# Patient Record
Sex: MALE | Race: WHITE | Employment: OTHER | URBAN - METROPOLITAN AREA
[De-identification: names, ages, dates, MRNs, and addresses within clinical notes are randomized per-mention and may not be internally consistent; named-entity substitution may affect disease eponyms.]

---

## 2017-01-01 ENCOUNTER — ANESTHESIA EVENT (OUTPATIENT)
Dept: SURGERY | Facility: HOSPITAL | Age: 77
End: 2017-01-01

## 2017-01-02 ENCOUNTER — ANESTHESIA (OUTPATIENT)
Dept: SURGERY | Facility: HOSPITAL | Age: 77
End: 2017-01-02

## 2017-01-02 ENCOUNTER — SURGERY (OUTPATIENT)
Age: 77
End: 2017-01-02

## 2017-01-02 NOTE — ANESTHESIA POSTPROCEDURE EVALUATION
Daryn 53 Patient Status:  Hospital Outpatient Surgery   Age/Gender 64year old male MRN [id number]   Location [address] [facility] Attending La Nena Gaona MD   Hosp Day # 0 PCP MD Bashir Jurado

## 2017-01-02 NOTE — ANESTHESIA PREPROCEDURE EVALUATION
PRE-OP EVALUATION    Patient Name: Jason Randhawa    Pre-op Diagnosis: Peritoneal mesothelioma ([facility] [address] [C45.1]    Procedure(s):  Diagnostic laparoscopy    Surgeon(s) and Role:     Caroline Garvin MD - Primary    Pre-op vitals reviewed.   Temp: 97.9 °F (36.6 12/31/2016   CO2 28.0 12/31/2016   BUN 10 12/31/2016   CREATSERUM 0.95 12/31/2016   GLU 85 12/31/2016   CA 8.3 12/31/2016            Airway      Mallampati: III  Mouth opening: >3 FB  TM distance: 4 - 6 cm  Neck ROM: full Cardiovascular    Cardiovascular e

## 2017-01-03 ENCOUNTER — PRIOR ORIGINAL RECORDS (OUTPATIENT)
Dept: OTHER | Age: 77
End: 2017-01-03

## 2017-01-03 ENCOUNTER — TELEPHONE (OUTPATIENT)
Dept: SURGERY | Facility: CLINIC | Age: 77
End: 2017-01-03

## 2017-01-03 DIAGNOSIS — C45.1 PERITONEAL MESOTHELIOMA (HCC): ICD-10-CM

## 2017-01-03 NOTE — TELEPHONE ENCOUNTER
Called patient to see how he was feeing after Diagnostic laparoscopy done yesterday. Patient states he feels good, denies any pain or discomfort.  Discussed moving forward with scheduling Laparoscopic HIPEC, patient wishes to have surgery schedule for 1/20/

## 2017-01-05 DIAGNOSIS — C45.1 PERITONEAL MESOTHELIOMA (HCC): Primary | ICD-10-CM

## 2017-01-11 ENCOUNTER — OFFICE VISIT (OUTPATIENT)
Dept: SURGERY | Facility: CLINIC | Age: 77
End: 2017-01-11

## 2017-01-11 VITALS
DIASTOLIC BLOOD PRESSURE: 89 MMHG | OXYGEN SATURATION: 98 % | SYSTOLIC BLOOD PRESSURE: 156 MMHG | WEIGHT: 148.63 LBS | BODY MASS INDEX: 24.76 KG/M2 | HEIGHT: 65 IN | HEART RATE: 79 BPM

## 2017-01-11 DIAGNOSIS — C45.1 PERITONEAL MESOTHELIOMA (HCC): Primary | ICD-10-CM

## 2017-01-11 PROCEDURE — 99024 POSTOP FOLLOW-UP VISIT: CPT | Performed by: SURGERY

## 2017-01-11 NOTE — PROGRESS NOTES
[facility]    Patient Name:  Gail Cavazos   YOB: 1940   Gender:  Male   Appt Date:  1/11/2017   Provider:  Shy Gallegos MD   Insurance:  [address] [place] [address]  Referring Provider: Dr. Polo Shone •  folic acid 1 MG Oral Tab, , Disp: , Rfl: 1  •  Levothyroxine Sodium 125 MCG Oral Tab, Take 125 mcg by mouth before breakfast., Disp: , Rfl:      Allergies Reviewed:  No Known Allergies     History:  Reviewed:  Past Medical History   Diagnosis Date   • H The findings were discussed with the patient at length. Patient appears to be a candidate for cytoreductive surgery and HIPEC. The surgical treatment matter was discussed with the patient.  This will include the above mentioned, in addition to possibility o

## 2017-01-13 ENCOUNTER — HOSPITAL (OUTPATIENT)
Dept: OTHER | Age: 77
End: 2017-01-13
Attending: INTERNAL MEDICINE

## 2017-01-13 ENCOUNTER — RESEARCH ENCOUNTER (OUTPATIENT)
Dept: HEMATOLOGY/ONCOLOGY | Facility: HOSPITAL | Age: 77
End: 2017-01-13

## 2017-01-13 NOTE — PROGRESS NOTES
STUDY: HIPEC DATABASE   ID # [id number]    Met with patient and his son today following visit with Alissa Mcclain. Reviewed HIPEC database registry, went over the consent and  allowed time for questions, concerns.  Pt verbalized good understanding and gave informe

## 2017-01-17 ENCOUNTER — ANESTHESIA EVENT (OUTPATIENT)
Dept: SURGERY | Facility: HOSPITAL | Age: 77
DRG: 342 | End: 2017-01-17
Payer: MEDICARE

## 2017-01-18 DIAGNOSIS — C45.1 PERITONEAL MESOTHELIOMA (HCC): Primary | ICD-10-CM

## 2017-01-19 ENCOUNTER — CHARTING TRANS (OUTPATIENT)
Dept: OTHER | Age: 77
End: 2017-01-19

## 2017-01-19 ENCOUNTER — DIAGNOSTIC TRANS (OUTPATIENT)
Dept: OTHER | Age: 77
End: 2017-01-19

## 2017-01-19 ENCOUNTER — HOSPITAL (OUTPATIENT)
Dept: OTHER | Age: 77
End: 2017-01-19
Attending: INTERNAL MEDICINE

## 2017-01-19 LAB
ANALYZER ANC (IANC): ABNORMAL
BASOPHILS # BLD: 0 THOUSAND/MCL (ref 0–0.3)
BASOPHILS NFR BLD: 1 %
DIFFERENTIAL METHOD BLD: ABNORMAL
EOSINOPHIL # BLD: 0.2 THOUSAND/MCL (ref 0.1–0.5)
EOSINOPHIL NFR BLD: 5 %
ERYTHROCYTE [DISTWIDTH] IN BLOOD: 15.5 % (ref 11–15)
HEMATOCRIT: 35.6 % (ref 39–51)
HGB BLD-MCNC: 11.1 GM/DL (ref 13–17)
LYMPHOCYTES # BLD: 1.5 THOUSAND/MCL (ref 1–4)
LYMPHOCYTES NFR BLD: 34 %
MCH RBC QN AUTO: 33 PG (ref 26–34)
MCHC RBC AUTO-ENTMCNC: 31.2 GM/DL (ref 32–36.5)
MCV RBC AUTO: 106 FL (ref 78–100)
MONOCYTES # BLD: 0.4 THOUSAND/MCL (ref 0.3–0.9)
MONOCYTES NFR BLD: 9 %
NEUTROPHILS # BLD: 2.2 THOUSAND/MCL (ref 1.8–7.7)
NEUTROPHILS NFR BLD: 51 %
NEUTS SEG NFR BLD: ABNORMAL %
PERCENT NRBC: ABNORMAL
PLATELET # BLD: 195 THOUSAND/MCL (ref 140–450)
RBC # BLD: 3.36 MILLION/MCL (ref 4.5–5.9)
WBC # BLD: 4.3 THOUSAND/MCL (ref 4.2–11)

## 2017-01-20 ENCOUNTER — ANESTHESIA (OUTPATIENT)
Dept: SURGERY | Facility: HOSPITAL | Age: 77
DRG: 342 | End: 2017-01-20
Payer: MEDICARE

## 2017-01-23 DIAGNOSIS — C45.1 PERITONEAL MESOTHELIOMA (HCC): Primary | ICD-10-CM

## 2017-01-24 RX ORDER — CARVEDILOL 3.12 MG/1
3.12 TABLET ORAL 2 TIMES DAILY WITH MEALS
COMMUNITY

## 2017-01-24 RX ORDER — LISINOPRIL 2.5 MG/1
2.5 TABLET ORAL DAILY
COMMUNITY

## 2017-01-24 RX ORDER — SODIUM CHLORIDE, SODIUM LACTATE, POTASSIUM CHLORIDE, CALCIUM CHLORIDE 600; 310; 30; 20 MG/100ML; MG/100ML; MG/100ML; MG/100ML
INJECTION, SOLUTION INTRAVENOUS CONTINUOUS
Status: CANCELLED | OUTPATIENT
Start: 2017-01-24

## 2017-01-24 RX ORDER — ATORVASTATIN CALCIUM 20 MG/1
20 TABLET, FILM COATED ORAL NIGHTLY
COMMUNITY

## 2017-01-31 ENCOUNTER — HOSPITAL ENCOUNTER (INPATIENT)
Facility: HOSPITAL | Age: 77
LOS: 6 days | Discharge: HOME HEALTH | DRG: 342 | End: 2017-02-06
Attending: SURGERY | Admitting: SURGERY
Payer: MEDICARE

## 2017-01-31 ENCOUNTER — APPOINTMENT (OUTPATIENT)
Dept: GENERAL RADIOLOGY | Facility: HOSPITAL | Age: 77
DRG: 342 | End: 2017-01-31
Attending: NURSE PRACTITIONER
Payer: MEDICARE

## 2017-01-31 ENCOUNTER — SURGERY (OUTPATIENT)
Age: 77
End: 2017-01-31

## 2017-01-31 DIAGNOSIS — C45.1 PERITONEAL MESOTHELIOMA (HCC): ICD-10-CM

## 2017-01-31 DIAGNOSIS — J93.9 PNEUMOTHORAX ON RIGHT: Primary | ICD-10-CM

## 2017-01-31 LAB
ALBUMIN SERPL-MCNC: 1.7 G/DL (ref 3.5–4.8)
ALP LIVER SERPL-CCNC: 55 U/L (ref 45–117)
ALT SERPL-CCNC: 191 U/L (ref 17–63)
ANTIBODY SCREEN: NEGATIVE
ARTERIAL BLD GAS O2 SATURATION: 96 % (ref 92–100)
ARTERIAL BLOOD GAS BASE EXCESS: -9.8
ARTERIAL BLOOD GAS HCO3: 15.8 MEQ/L (ref 22–26)
ARTERIAL BLOOD GAS PCO2: 33 MM HG (ref 35–45)
ARTERIAL BLOOD GAS PH: 7.3 (ref 7.35–7.45)
ARTERIAL BLOOD GAS PO2: 133 MM HG (ref 80–105)
AST SERPL-CCNC: 309 U/L (ref 15–41)
BILIRUB SERPL-MCNC: 0.6 MG/DL (ref 0.1–2)
BUN BLD-MCNC: 7 MG/DL (ref 8–20)
CALCIUM BLD-MCNC: 6.3 MG/DL (ref 8.3–10.3)
CALCULATED O2 SATURATION: 99 % (ref 92–100)
CARBOXYHEMOGLOBIN: 1.6 % SAT (ref 0–3)
CHLORIDE: 116 MMOL/L (ref 101–111)
CO2: 18 MMOL/L (ref 22–32)
CREAT BLD-MCNC: 0.57 MG/DL (ref 0.7–1.3)
ERYTHROCYTE [DISTWIDTH] IN BLOOD BY AUTOMATED COUNT: 19.7 % (ref 11.5–16)
GLUCOSE BLD-MCNC: 105 MG/DL (ref 65–99)
GLUCOSE BLD-MCNC: 138 MG/DL (ref 70–99)
GLUCOSE BLD-MCNC: 150 MG/DL (ref 65–99)
GLUCOSE BLD-MCNC: 80 MG/DL (ref 65–99)
HAV IGM SER QL: 1.1 MG/DL (ref 1.7–3)
HCT VFR BLD AUTO: 33.8 % (ref 37–53)
HGB BLD-MCNC: 11 G/DL (ref 13–17)
IONIZED CALCIUM: 1.06 MMOL/L (ref 1.12–1.32)
ISTAT BLOOD GAS BASE DEFICIT: -1 MMOL/L
ISTAT BLOOD GAS BASE DEFICIT: -4 MMOL/L
ISTAT BLOOD GAS HCO3: 20.7 MEQ/L (ref 22–26)
ISTAT BLOOD GAS HCO3: 23.4 MEQ/L (ref 22–26)
ISTAT BLOOD GAS O2 SATURATION: 100 % (ref 92–100)
ISTAT BLOOD GAS O2 SATURATION: 100 % (ref 92–100)
ISTAT BLOOD GAS PCO2: 35 MMHG (ref 35–45)
ISTAT BLOOD GAS PCO2: 38 MMHG (ref 35–45)
ISTAT BLOOD GAS PH: 7.38 (ref 7.35–7.45)
ISTAT BLOOD GAS PH: 7.4 (ref 7.35–7.45)
ISTAT BLOOD GAS PO2: 215 MMHG (ref 80–105)
ISTAT BLOOD GAS PO2: 228 MMHG (ref 80–105)
ISTAT BLOOD GAS TCO2: 22 MMOL/L (ref 22–32)
ISTAT BLOOD GAS TCO2: 24 MMOL/L (ref 22–32)
ISTAT HEMATOCRIT: 25 % (ref 37–54)
ISTAT HEMATOCRIT: 26 % (ref 37–54)
ISTAT IONIZED CALCIUM: 1.03 MMOL/L (ref 1.12–1.32)
ISTAT IONIZED CALCIUM: 1.12 MMOL/L (ref 1.12–1.32)
ISTAT POTASSIUM: 3.2 MMOL/L (ref 3.6–5.1)
ISTAT POTASSIUM: 3.3 MMOL/L (ref 3.6–5.1)
ISTAT SODIUM: 141 MMOL/L (ref 136–144)
ISTAT SODIUM: 141 MMOL/L (ref 136–144)
L/M: 8 L/MIN
LACTIC ACID ARTERIAL: <1.3 MMOL/L (ref 0.5–2)
M PROTEIN MFR SERPL ELPH: 4.1 G/DL (ref 6.1–8.3)
MCH RBC QN AUTO: 32.4 PG (ref 27–33.2)
MCHC RBC AUTO-ENTMCNC: 32.5 G/DL (ref 31–37)
MCV RBC AUTO: 99.4 FL (ref 80–99)
METHEMOGLOBIN: 0.6 % SAT (ref 0.4–1.5)
PATIENT TEMPERATURE: 97.5 F
PHOSPHATE SERPL-MCNC: 1.6 MG/DL (ref 2.5–4.9)
PLATELET # BLD AUTO: 147 10(3)UL (ref 150–450)
POTASSIUM BLOOD GAS: 3.3 MMOL/L (ref 3.6–5.1)
POTASSIUM SERPL-SCNC: 3.6 MMOL/L (ref 3.6–5.1)
RBC # BLD AUTO: 3.4 X10(6)UL (ref 3.8–5.8)
RED CELL DISTRIBUTION WIDTH-SD: 71 FL (ref 35.1–46.3)
RH BLOOD TYPE: POSITIVE
SODIUM BLOOD GAS: 141 MMOL/L (ref 136–144)
SODIUM SERPL-SCNC: 145 MMOL/L (ref 136–144)
TOTAL HEMOGLOBIN: 11.5 G/DL (ref 12.6–17.4)
WBC # BLD AUTO: 15.9 X10(3) UL (ref 4–13)

## 2017-01-31 PROCEDURE — 0FT40ZZ RESECTION OF GALLBLADDER, OPEN APPROACH: ICD-10-PCS | Performed by: SURGERY

## 2017-01-31 PROCEDURE — 82803 BLOOD GASES ANY COMBINATION: CPT

## 2017-01-31 PROCEDURE — 0DTJ0ZZ RESECTION OF APPENDIX, OPEN APPROACH: ICD-10-PCS | Performed by: SURGERY

## 2017-01-31 PROCEDURE — 82962 GLUCOSE BLOOD TEST: CPT

## 2017-01-31 PROCEDURE — 84295 ASSAY OF SERUM SODIUM: CPT | Performed by: NURSE PRACTITIONER

## 2017-01-31 PROCEDURE — 3E0M305 INTRODUCTION OF OTHER ANTINEOPLASTIC INTO PERITONEAL CAVITY, PERCUTANEOUS APPROACH: ICD-10-PCS | Performed by: SURGERY

## 2017-01-31 PROCEDURE — 82330 ASSAY OF CALCIUM: CPT

## 2017-01-31 PROCEDURE — 88342 IMHCHEM/IMCYTCHM 1ST ANTB: CPT | Performed by: SURGERY

## 2017-01-31 PROCEDURE — 84100 ASSAY OF PHOSPHORUS: CPT | Performed by: SURGERY

## 2017-01-31 PROCEDURE — 30233N1 TRANSFUSION OF NONAUTOLOGOUS RED BLOOD CELLS INTO PERIPHERAL VEIN, PERCUTANEOUS APPROACH: ICD-10-PCS | Performed by: SURGERY

## 2017-01-31 PROCEDURE — 0DBT0ZZ: ICD-10-PCS | Performed by: SURGERY

## 2017-01-31 PROCEDURE — 83735 ASSAY OF MAGNESIUM: CPT | Performed by: SURGERY

## 2017-01-31 PROCEDURE — DWY68ZZ HYPERTHERMIA OF PELVIC REGION: ICD-10-PCS | Performed by: SURGERY

## 2017-01-31 PROCEDURE — 88305 TISSUE EXAM BY PATHOLOGIST: CPT | Performed by: SURGERY

## 2017-01-31 PROCEDURE — 0BBS0ZZ: ICD-10-PCS | Performed by: SURGERY

## 2017-01-31 PROCEDURE — 86850 RBC ANTIBODY SCREEN: CPT | Performed by: SURGERY

## 2017-01-31 PROCEDURE — 83605 ASSAY OF LACTIC ACID: CPT | Performed by: NURSE PRACTITIONER

## 2017-01-31 PROCEDURE — 88304 TISSUE EXAM BY PATHOLOGIST: CPT | Performed by: SURGERY

## 2017-01-31 PROCEDURE — 88307 TISSUE EXAM BY PATHOLOGIST: CPT | Performed by: SURGERY

## 2017-01-31 PROCEDURE — 0BBR0ZZ: ICD-10-PCS | Performed by: SURGERY

## 2017-01-31 PROCEDURE — 86901 BLOOD TYPING SEROLOGIC RH(D): CPT | Performed by: SURGERY

## 2017-01-31 PROCEDURE — 87081 CULTURE SCREEN ONLY: CPT | Performed by: INTERNAL MEDICINE

## 2017-01-31 PROCEDURE — 84295 ASSAY OF SERUM SODIUM: CPT

## 2017-01-31 PROCEDURE — C9113 INJ PANTOPRAZOLE SODIUM, VIA: HCPCS | Performed by: SURGERY

## 2017-01-31 PROCEDURE — 82375 ASSAY CARBOXYHB QUANT: CPT | Performed by: NURSE PRACTITIONER

## 2017-01-31 PROCEDURE — 82803 BLOOD GASES ANY COMBINATION: CPT | Performed by: NURSE PRACTITIONER

## 2017-01-31 PROCEDURE — 85027 COMPLETE CBC AUTOMATED: CPT | Performed by: SURGERY

## 2017-01-31 PROCEDURE — 85014 HEMATOCRIT: CPT

## 2017-01-31 PROCEDURE — 83050 HGB METHEMOGLOBIN QUAN: CPT | Performed by: NURSE PRACTITIONER

## 2017-01-31 PROCEDURE — 0DBW0ZZ EXCISION OF PERITONEUM, OPEN APPROACH: ICD-10-PCS | Performed by: SURGERY

## 2017-01-31 PROCEDURE — 0WBF0ZZ EXCISION OF ABDOMINAL WALL, OPEN APPROACH: ICD-10-PCS | Performed by: SURGERY

## 2017-01-31 PROCEDURE — 88341 IMHCHEM/IMCYTCHM EA ADD ANTB: CPT | Performed by: SURGERY

## 2017-01-31 PROCEDURE — 84132 ASSAY OF SERUM POTASSIUM: CPT | Performed by: NURSE PRACTITIONER

## 2017-01-31 PROCEDURE — 86900 BLOOD TYPING SEROLOGIC ABO: CPT | Performed by: SURGERY

## 2017-01-31 PROCEDURE — 80053 COMPREHEN METABOLIC PANEL: CPT | Performed by: SURGERY

## 2017-01-31 PROCEDURE — 82330 ASSAY OF CALCIUM: CPT | Performed by: NURSE PRACTITIONER

## 2017-01-31 PROCEDURE — 71010 XR CHEST AP PORTABLE  (CPT=71010): CPT

## 2017-01-31 PROCEDURE — 86920 COMPATIBILITY TEST SPIN: CPT

## 2017-01-31 PROCEDURE — 0TN70ZZ RELEASE LEFT URETER, OPEN APPROACH: ICD-10-PCS | Performed by: SURGERY

## 2017-01-31 PROCEDURE — 85018 HEMOGLOBIN: CPT | Performed by: NURSE PRACTITIONER

## 2017-01-31 PROCEDURE — 84132 ASSAY OF SERUM POTASSIUM: CPT

## 2017-01-31 DEVICE — VENTRIO HERNIA PATCH CIRCLE
Type: IMPLANTABLE DEVICE | Site: ABDOMEN | Status: FUNCTIONAL
Brand: VENTRIO HERNIA PATCH

## 2017-01-31 DEVICE — SURGIFLO HEMOSTATIC MATRIX KIT: Type: IMPLANTABLE DEVICE | Status: FUNCTIONAL

## 2017-01-31 RX ORDER — HYDROMORPHONE HYDROCHLORIDE 1 MG/ML
0.5 INJECTION, SOLUTION INTRAMUSCULAR; INTRAVENOUS; SUBCUTANEOUS ONCE
Status: DISCONTINUED | OUTPATIENT
Start: 2017-01-31 | End: 2017-02-01

## 2017-01-31 RX ORDER — HEPARIN SODIUM 5000 [USP'U]/ML
5000 INJECTION, SOLUTION INTRAVENOUS; SUBCUTANEOUS ONCE
Status: COMPLETED | OUTPATIENT
Start: 2017-01-31 | End: 2017-01-31

## 2017-01-31 RX ORDER — CISPLATIN 1 MG/ML
45 INJECTION INTRAVENOUS ONCE
Status: COMPLETED | OUTPATIENT
Start: 2017-01-31 | End: 2017-01-31

## 2017-01-31 RX ORDER — METRONIDAZOLE 500 MG/100ML
INJECTION, SOLUTION INTRAVENOUS
Status: DISPENSED
Start: 2017-01-31 | End: 2017-01-31

## 2017-01-31 RX ORDER — ENOXAPARIN SODIUM 100 MG/ML
40 INJECTION SUBCUTANEOUS DAILY
Status: DISCONTINUED | OUTPATIENT
Start: 2017-02-01 | End: 2017-02-06

## 2017-01-31 RX ORDER — NALOXONE HYDROCHLORIDE 0.4 MG/ML
80 INJECTION, SOLUTION INTRAMUSCULAR; INTRAVENOUS; SUBCUTANEOUS AS NEEDED
Status: DISCONTINUED | OUTPATIENT
Start: 2017-01-31 | End: 2017-02-01

## 2017-01-31 RX ORDER — IPRATROPIUM BROMIDE AND ALBUTEROL SULFATE 2.5; .5 MG/3ML; MG/3ML
3 SOLUTION RESPIRATORY (INHALATION) EVERY 4 HOURS PRN
Status: DISCONTINUED | OUTPATIENT
Start: 2017-01-31 | End: 2017-02-06

## 2017-01-31 RX ORDER — LIDOCAINE HYDROCHLORIDE 10 MG/ML
10 INJECTION, SOLUTION INFILTRATION; PERINEURAL ONCE
Status: COMPLETED | OUTPATIENT
Start: 2017-02-01 | End: 2017-02-01

## 2017-01-31 RX ORDER — SODIUM CHLORIDE, SODIUM LACTATE, POTASSIUM CHLORIDE, CALCIUM CHLORIDE 600; 310; 30; 20 MG/100ML; MG/100ML; MG/100ML; MG/100ML
INJECTION, SOLUTION INTRAVENOUS CONTINUOUS
Status: DISCONTINUED | OUTPATIENT
Start: 2017-01-31 | End: 2017-02-01

## 2017-01-31 RX ORDER — LIDOCAINE HYDROCHLORIDE 20 MG/ML
INJECTION, SOLUTION EPIDURAL; INFILTRATION; INTRACAUDAL; PERINEURAL
Status: DISCONTINUED
Start: 2017-01-31 | End: 2017-02-01 | Stop reason: WASHOUT

## 2017-01-31 RX ORDER — HEPARIN SODIUM 5000 [USP'U]/ML
INJECTION, SOLUTION INTRAVENOUS; SUBCUTANEOUS
Status: COMPLETED
Start: 2017-01-31 | End: 2017-01-31

## 2017-01-31 RX ORDER — METRONIDAZOLE 500 MG/100ML
500 INJECTION, SOLUTION INTRAVENOUS ONCE
Status: COMPLETED | OUTPATIENT
Start: 2017-01-31 | End: 2017-01-31

## 2017-01-31 RX ORDER — HYDROMORPHONE HYDROCHLORIDE 1 MG/ML
0.4 INJECTION, SOLUTION INTRAMUSCULAR; INTRAVENOUS; SUBCUTANEOUS EVERY 5 MIN PRN
Status: DISPENSED | OUTPATIENT
Start: 2017-01-31 | End: 2017-01-31

## 2017-01-31 RX ORDER — ONDANSETRON 2 MG/ML
4 INJECTION INTRAMUSCULAR; INTRAVENOUS EVERY 6 HOURS PRN
Status: DISCONTINUED | OUTPATIENT
Start: 2017-01-31 | End: 2017-02-06

## 2017-01-31 RX ORDER — SODIUM CHLORIDE 9 MG/ML
INJECTION, SOLUTION INTRAVENOUS CONTINUOUS
Status: DISCONTINUED | OUTPATIENT
Start: 2017-01-31 | End: 2017-02-01

## 2017-01-31 RX ORDER — LIDOCAINE HYDROCHLORIDE 10 MG/ML
INJECTION, SOLUTION EPIDURAL; INFILTRATION; INTRACAUDAL; PERINEURAL
Status: DISPENSED
Start: 2017-01-31 | End: 2017-02-01

## 2017-01-31 RX ORDER — CISPLATIN 1 MG/ML
45 INJECTION INTRAVENOUS ONCE
Status: DISCONTINUED | OUTPATIENT
Start: 2017-01-31 | End: 2017-01-31 | Stop reason: HOSPADM

## 2017-01-31 NOTE — H&P
There has been no significant in patient status as documented in EPIC. Surgery revisted. To proceed as planned. Ayana Lee MD FACS    Director of Surgical Oncology  Department of [address] [place]. [address]

## 2017-01-31 NOTE — ANESTHESIA PREPROCEDURE EVALUATION
PRE-OP EVALUATION    Patient Name: Gail Cavazos    Pre-op Diagnosis: Peritoneal mesothelioma ([facility] [address] [C45.1]    Procedure(s):  CYTOREDUCTIVE SURGERY, POSSIBLE MULTI-ORGAN RESECTION, HYPERTHERMIC INTRAPERITONEAL CHEMOTHERAPY     Surgeon(s) and Role:     * Smoking status: Former Smoker  1.00 Packs/Day  For 15.00 Years     Quit date: 05/01/2016    Smokeless tobacco: Never Used    Alcohol Use: Yes    Comment: 1-2 per month/social       Drug Use: No     Available pre-op labs reviewed.     Lab Results  Component

## 2017-02-01 ENCOUNTER — APPOINTMENT (OUTPATIENT)
Dept: GENERAL RADIOLOGY | Facility: HOSPITAL | Age: 77
DRG: 342 | End: 2017-02-01
Attending: NURSE PRACTITIONER
Payer: MEDICARE

## 2017-02-01 ENCOUNTER — APPOINTMENT (OUTPATIENT)
Dept: GENERAL RADIOLOGY | Facility: HOSPITAL | Age: 77
DRG: 342 | End: 2017-02-01
Attending: SURGERY
Payer: MEDICARE

## 2017-02-01 ENCOUNTER — APPOINTMENT (OUTPATIENT)
Dept: GENERAL RADIOLOGY | Facility: HOSPITAL | Age: 77
DRG: 342 | End: 2017-02-01
Attending: INTERNAL MEDICINE
Payer: MEDICARE

## 2017-02-01 LAB
ALBUMIN SERPL-MCNC: 2.1 G/DL (ref 3.5–4.8)
ALLENS TEST: POSITIVE
ALP LIVER SERPL-CCNC: 62 U/L (ref 45–117)
ALT SERPL-CCNC: 264 U/L (ref 17–63)
ARTERIAL BLD GAS O2 SATURATION: 95 % (ref 92–100)
ARTERIAL BLD GAS O2 SATURATION: 95 % (ref 92–100)
ARTERIAL BLOOD GAS BASE EXCESS: -10.6
ARTERIAL BLOOD GAS BASE EXCESS: -5.8
ARTERIAL BLOOD GAS HCO3: 15.5 MEQ/L (ref 22–26)
ARTERIAL BLOOD GAS HCO3: 19.6 MEQ/L (ref 22–26)
ARTERIAL BLOOD GAS PCO2: 35 MM HG (ref 35–45)
ARTERIAL BLOOD GAS PCO2: 38 MM HG (ref 35–45)
ARTERIAL BLOOD GAS PH: 7.26 (ref 7.35–7.45)
ARTERIAL BLOOD GAS PH: 7.33 (ref 7.35–7.45)
ARTERIAL BLOOD GAS PO2: 96 MM HG (ref 80–105)
ARTERIAL BLOOD GAS PO2: 97 MM HG (ref 80–105)
AST SERPL-CCNC: 330 U/L (ref 15–41)
BILIRUB SERPL-MCNC: 0.3 MG/DL (ref 0.1–2)
BUN BLD-MCNC: 8 MG/DL (ref 8–20)
CALCIUM BLD-MCNC: 6.8 MG/DL (ref 8.3–10.3)
CALCULATED O2 SATURATION: 96 % (ref 92–100)
CALCULATED O2 SATURATION: 97 % (ref 92–100)
CARBOXYHEMOGLOBIN: 1.2 % SAT (ref 0–3)
CARBOXYHEMOGLOBIN: 1.2 % SAT (ref 0–3)
CHLORIDE: 112 MMOL/L (ref 101–111)
CK: 385 IU/L (ref 39–308)
CKMB: 5.6 NG/ML (ref 0–5)
CO2: 19 MMOL/L (ref 22–32)
CREAT BLD-MCNC: 0.72 MG/DL (ref 0.7–1.3)
ERYTHROCYTE [DISTWIDTH] IN BLOOD BY AUTOMATED COUNT: 19.7 % (ref 11.5–16)
FIO2: 40 %
GLUCOSE BLD-MCNC: 108 MG/DL (ref 65–99)
GLUCOSE BLD-MCNC: 116 MG/DL (ref 65–99)
GLUCOSE BLD-MCNC: 137 MG/DL (ref 65–99)
GLUCOSE BLD-MCNC: 142 MG/DL (ref 65–99)
GLUCOSE BLD-MCNC: 176 MG/DL (ref 70–99)
HAV IGM SER QL: 2.1 MG/DL (ref 1.7–3)
HCT VFR BLD AUTO: 37 % (ref 37–53)
HGB BLD-MCNC: 12 G/DL (ref 13–17)
IONIZED CALCIUM: 1.1 MMOL/L (ref 1.12–1.32)
IONIZED CALCIUM: 1.11 MMOL/L (ref 1.12–1.32)
L/M: 15 L/MIN
L/M: 5 L/MIN
LACTIC ACID ARTERIAL: 1.4 MMOL/L (ref 0.5–2)
LACTIC ACID ARTERIAL: 1.4 MMOL/L (ref 0.5–2)
M PROTEIN MFR SERPL ELPH: 5 G/DL (ref 6.1–8.3)
MB INDEX: 1 % (ref ?–4)
MCH RBC QN AUTO: 31.9 PG (ref 27–33.2)
MCHC RBC AUTO-ENTMCNC: 32.4 G/DL (ref 31–37)
MCV RBC AUTO: 98.4 FL (ref 80–99)
METHEMOGLOBIN: 0.6 % SAT (ref 0.4–1.5)
METHEMOGLOBIN: 0.6 % SAT (ref 0.4–1.5)
PATIENT TEMPERATURE: 98.3 F
PATIENT TEMPERATURE: 98.4 F
PHOSPHATE SERPL-MCNC: 3.1 MG/DL (ref 2.5–4.9)
PLATELET # BLD AUTO: 145 10(3)UL (ref 150–450)
POTASSIUM BLOOD GAS: 3.7 MMOL/L (ref 3.6–5.1)
POTASSIUM BLOOD GAS: 4.1 MMOL/L (ref 3.6–5.1)
POTASSIUM SERPL-SCNC: 3.8 MMOL/L (ref 3.6–5.1)
RBC # BLD AUTO: 3.76 X10(6)UL (ref 3.8–5.8)
RED CELL DISTRIBUTION WIDTH-SD: 70.7 FL (ref 35.1–46.3)
SODIUM BLOOD GAS: 140 MMOL/L (ref 136–144)
SODIUM BLOOD GAS: 141 MMOL/L (ref 136–144)
SODIUM SERPL-SCNC: 145 MMOL/L (ref 136–144)
TOTAL HEMOGLOBIN: 11.6 G/DL (ref 12.6–17.4)
TOTAL HEMOGLOBIN: 12.1 G/DL (ref 12.6–17.4)
TROPONIN: <0.046 NG/ML (ref ?–0.05)
WBC # BLD AUTO: 12.9 X10(3) UL (ref 4–13)

## 2017-02-01 PROCEDURE — 36600 WITHDRAWAL OF ARTERIAL BLOOD: CPT | Performed by: INTERNAL MEDICINE

## 2017-02-01 PROCEDURE — 97163 PT EVAL HIGH COMPLEX 45 MIN: CPT

## 2017-02-01 PROCEDURE — A4216 STERILE WATER/SALINE, 10 ML: HCPCS | Performed by: INTERNAL MEDICINE

## 2017-02-01 PROCEDURE — P9045 ALBUMIN (HUMAN), 5%, 250 ML: HCPCS | Performed by: NURSE PRACTITIONER

## 2017-02-01 PROCEDURE — 97116 GAIT TRAINING THERAPY: CPT

## 2017-02-01 PROCEDURE — 83050 HGB METHEMOGLOBIN QUAN: CPT | Performed by: INTERNAL MEDICINE

## 2017-02-01 PROCEDURE — 0W9930Z DRAINAGE OF RIGHT PLEURAL CAVITY WITH DRAINAGE DEVICE, PERCUTANEOUS APPROACH: ICD-10-PCS | Performed by: SURGERY

## 2017-02-01 PROCEDURE — 93005 ELECTROCARDIOGRAM TRACING: CPT

## 2017-02-01 PROCEDURE — 85018 HEMOGLOBIN: CPT | Performed by: INTERNAL MEDICINE

## 2017-02-01 PROCEDURE — 80053 COMPREHEN METABOLIC PANEL: CPT | Performed by: SURGERY

## 2017-02-01 PROCEDURE — 85027 COMPLETE CBC AUTOMATED: CPT | Performed by: NURSE PRACTITIONER

## 2017-02-01 PROCEDURE — 82962 GLUCOSE BLOOD TEST: CPT

## 2017-02-01 PROCEDURE — 82375 ASSAY CARBOXYHB QUANT: CPT | Performed by: INTERNAL MEDICINE

## 2017-02-01 PROCEDURE — 71010 XR CHEST AP PORTABLE  (CPT=71010): CPT

## 2017-02-01 PROCEDURE — 82553 CREATINE MB FRACTION: CPT | Performed by: INTERNAL MEDICINE

## 2017-02-01 PROCEDURE — C9113 INJ PANTOPRAZOLE SODIUM, VIA: HCPCS | Performed by: NURSE PRACTITIONER

## 2017-02-01 PROCEDURE — P9047 ALBUMIN (HUMAN), 25%, 50ML: HCPCS | Performed by: SURGERY

## 2017-02-01 PROCEDURE — 93010 ELECTROCARDIOGRAM REPORT: CPT | Performed by: INTERNAL MEDICINE

## 2017-02-01 PROCEDURE — 83605 ASSAY OF LACTIC ACID: CPT | Performed by: INTERNAL MEDICINE

## 2017-02-01 PROCEDURE — 83735 ASSAY OF MAGNESIUM: CPT | Performed by: NURSE PRACTITIONER

## 2017-02-01 PROCEDURE — 84484 ASSAY OF TROPONIN QUANT: CPT | Performed by: INTERNAL MEDICINE

## 2017-02-01 PROCEDURE — 84295 ASSAY OF SERUM SODIUM: CPT | Performed by: INTERNAL MEDICINE

## 2017-02-01 PROCEDURE — 82330 ASSAY OF CALCIUM: CPT | Performed by: INTERNAL MEDICINE

## 2017-02-01 PROCEDURE — 82803 BLOOD GASES ANY COMBINATION: CPT | Performed by: INTERNAL MEDICINE

## 2017-02-01 PROCEDURE — 82550 ASSAY OF CK (CPK): CPT | Performed by: INTERNAL MEDICINE

## 2017-02-01 PROCEDURE — 84100 ASSAY OF PHOSPHORUS: CPT | Performed by: NURSE PRACTITIONER

## 2017-02-01 PROCEDURE — 84132 ASSAY OF SERUM POTASSIUM: CPT | Performed by: INTERNAL MEDICINE

## 2017-02-01 RX ORDER — DOBUTAMINE HYDROCHLORIDE 100 MG/100ML
1 INJECTION INTRAVENOUS CONTINUOUS
Status: DISCONTINUED | OUTPATIENT
Start: 2017-02-01 | End: 2017-02-01

## 2017-02-01 RX ORDER — ALBUMIN, HUMAN INJ 5% 5 %
250 SOLUTION INTRAVENOUS ONCE
Status: COMPLETED | OUTPATIENT
Start: 2017-02-01 | End: 2017-02-01

## 2017-02-01 RX ORDER — POTASSIUM CHLORIDE 14.9 MG/ML
20 INJECTION INTRAVENOUS ONCE
Status: COMPLETED | OUTPATIENT
Start: 2017-02-01 | End: 2017-02-01

## 2017-02-01 RX ORDER — HYDROMORPHONE HYDROCHLORIDE 1 MG/ML
0.3 INJECTION, SOLUTION INTRAMUSCULAR; INTRAVENOUS; SUBCUTANEOUS ONCE
Status: COMPLETED | OUTPATIENT
Start: 2017-02-01 | End: 2017-02-01

## 2017-02-01 RX ORDER — ENALAPRILAT 2.5 MG/2ML
1.25 INJECTION INTRAVENOUS EVERY 6 HOURS PRN
Status: DISCONTINUED | OUTPATIENT
Start: 2017-02-01 | End: 2017-02-06

## 2017-02-01 RX ORDER — FUROSEMIDE 10 MG/ML
20 INJECTION INTRAMUSCULAR; INTRAVENOUS ONCE
Status: COMPLETED | OUTPATIENT
Start: 2017-02-01 | End: 2017-02-01

## 2017-02-01 RX ORDER — METOPROLOL TARTRATE 5 MG/5ML
5 INJECTION INTRAVENOUS EVERY 6 HOURS
Status: DISCONTINUED | OUTPATIENT
Start: 2017-02-01 | End: 2017-02-06

## 2017-02-01 RX ORDER — ALBUMIN (HUMAN) 12.5 G/50ML
25 SOLUTION INTRAVENOUS ONCE
Status: COMPLETED | OUTPATIENT
Start: 2017-02-01 | End: 2017-02-01

## 2017-02-01 NOTE — PLAN OF CARE
CARDIOVASCULAR - ADULT    • Maintains optimal cardiac output and hemodynamic stability Progressing        GASTROINTESTINAL - ADULT    • Minimal or absence of nausea and vomiting Progressing        GENITOURINARY - ADULT    • Absence of urinary retention Pro

## 2017-02-01 NOTE — CONSULTS
[facility]  Report of Consultation    Durga Stanley Patient Status:  Inpatient    1940 MRN [id number]   [facility] 4SW-A Attending Iqra Caro MD   Hosp Day # 1 PCP Alis Alfaro MD     Reason for Consultation: Intensive c documented 50 years ago  · Hyperlipidemia    History reviewed. No pertinent past surgical history.   Family History   Problem Relation Age of Onset   • Cancer Daughter 43     breast cancer   • Cancer Son 7     Leukemia       reports that he quit smoking abo distress. Head: Normocephalic, without obvious abnormality, atraumatic. Eyes: Conjunctivae/corneas clear. No scleral icterus. No conjunctival     hemorrhage. Nose: Nares normal.   Throat: Lips, mucosa, and tongue normal.  No thrush noted.    Neck: S CHOLECYSTECTOMY, APPENDECTOMY, LEFT URETERLYSIS, PARTIAL RESECTION ABDOMINAL WALL, OMENTECTOMY, HIPEC WITH CISPLATIN     Assessment and Plan:    · Status post extensive abdominal surgery/debulking and HIPEC instillation for treatment of mesothelioma  · Pos

## 2017-02-01 NOTE — ANESTHESIA POSTPROCEDURE EVALUATION
Daryn 51 Patient Status:  Inpatient   Age/Gender 64year old male MRN [id number]   [facility] 4SW-A Attending Connor Kelly MD   Hosp Day # 0 PCP Michael Waller MD       Anesthesia Post-op Note    Procedure(s):  CYTO

## 2017-02-01 NOTE — CDS QUERY
Chest Imaging Results - Clinical Significance of Air in the Pleural [place]  Dear Doctor:  Clinical information (provided below) suggests air in the pleural space.  For accurate ICD-10-CM code assignment to reflect s

## 2017-02-01 NOTE — PROGRESS NOTES
ICU  Critical Care APN Progress Note    NAME: Florence Putnam - ROOM: [address] [place] [address] - MRN: [id number] - Age: 64year old - :1940    History Of Present Illness:  Florence Putnam is a 64year old male with PMHx significant for htn, hld, peritoneal s/p CYT Physical Exam:    General Appearance: Alert, cooperative, no distress, appears stated age  Neck: No JVD, neck supple, no adenopathy, trachea midline, no carotid bruits  Lungs: diminished to auscultation bilaterally, respirations unlabored  Heart: Regular r CREATSERUM 0.57 01/31/2017   BUN 7 01/31/2017    01/31/2017   K 3.6 01/31/2017    01/31/2017   CO2 18.0 01/31/2017    01/31/2017   CA 6.3 01/31/2017   ALB 1.7 01/31/2017   ALKPHO 55 01/31/2017   BILT 0.6 01/31/2017   TP 4.1 01/31/2017

## 2017-02-01 NOTE — PROGRESS NOTES
[facility] [address] History & Physical    Vlad Terry Patient Status:  Inpatient    1940 MRN [id number]   [facility] 4SW-A Attending Maurilio Layne MD   Hosp Day # 1 PCP Amanda Bojorquez MD     History of Present Illnes 99%    General Appearance:    Alert, cooperative, no distress, appears stated age   Head:    Normocephalic, without obvious abnormality, atraumatic   Eyes:    PERRL, conjunctiva/corneas clear, EOM's intact, fundi     benign, both eyes        Ears:    Jamee Erie Scan    Assessment:  Patient Active Problem List:     Hypertension     Hypothyroidism     Hyperlipidemia     Peritoneal mesothelioma ([facility] [address]          Plan:    Pt cleared for surgery earlier and underwent successful excision.     Patricia Sotelo  2/1/2017  4:40

## 2017-02-01 NOTE — PROGRESS NOTES
POD#1  I was involved with events overnight  Patient feels well    Blood pressure 136/65, pulse 81, temperature 98.4 °F (36.9 °C), temperature source Temporal, resp. rate 18, height 1.651 m (5' 5\"), weight 70.5 kg (155 lb 6.8 oz), SpO2 95 %.   I/O last 3 c

## 2017-02-01 NOTE — OPERATIVE REPORT
[address]    PATIENT'S NAME: Jhonny Dumont   ATTENDING PHYSICIAN: Giuseppe Chan. Herbert García MD   OPERATING PHYSICIAN: Giuseppe Chan.  Herbert García MD   PATIENT ACCOUNT#:   [id number]    LOCATION:  [address] [place] [address]  MEDICAL RECORD #:   [id number]       DATE OF BIRTH:  [birth year]/ Region 5, one. Region 6, two. Region 7, one. Region 8, one. Region 9, one. Region 10, two. Region 11, two. Region 12, one. Thus, peritoneal carcinomatosis index 20. The patient had mainly bulky disease involving the omentum.   He had what appeared sutured it to the diaphragm for the repair, using Prolene suture placed in a running fashion.   I then stripped other areas of peritoneal involvement, and used the PlasmaJet to destroy lesions within the small bowel mesentery that were scattered throughout, was entered. A catheter was dispensed with appropriately. Hemostasis was achieved and maintained. I placed a drain within the left subdiaphragmatic region and another drain within the pelvis. These were stitched in place using 3-0 nylon sutures.   The f

## 2017-02-01 NOTE — BRIEF OP NOTE
[address] [facility]  Brief Op Note     Hieu Mattson Location: OR   [facility] [id number] MRN [id number]   Admission Date 1/31/2017 Operation Date 1/31/2017   Attending Physician Gigi Chamorro MD Operating Physician Ventura Lauren MD       Pre-Operative Wilder Solis

## 2017-02-02 ENCOUNTER — APPOINTMENT (OUTPATIENT)
Dept: GENERAL RADIOLOGY | Facility: HOSPITAL | Age: 77
DRG: 342 | End: 2017-02-02
Attending: INTERNAL MEDICINE
Payer: MEDICARE

## 2017-02-02 LAB
ALBUMIN SERPL-MCNC: 2.3 G/DL (ref 3.5–4.8)
ALP LIVER SERPL-CCNC: 51 U/L (ref 45–117)
ALT SERPL-CCNC: 124 U/L (ref 17–63)
AST SERPL-CCNC: 92 U/L (ref 15–41)
ATRIAL RATE: 84 BPM
BASOPHILS # BLD AUTO: 0.02 X10(3) UL (ref 0–0.1)
BASOPHILS NFR BLD AUTO: 0.2 %
BILIRUB SERPL-MCNC: 0.4 MG/DL (ref 0.1–2)
BLOOD TYPE BARCODE: 7300
BUN BLD-MCNC: 10 MG/DL (ref 8–20)
CALCIUM BLD-MCNC: 7.3 MG/DL (ref 8.3–10.3)
CHLORIDE: 112 MMOL/L (ref 101–111)
CO2: 24 MMOL/L (ref 22–32)
CREAT BLD-MCNC: 0.82 MG/DL (ref 0.7–1.3)
EOSINOPHIL # BLD AUTO: 0.01 X10(3) UL (ref 0–0.3)
EOSINOPHIL NFR BLD AUTO: 0.1 %
ERYTHROCYTE [DISTWIDTH] IN BLOOD BY AUTOMATED COUNT: 19.3 % (ref 11.5–16)
GLUCOSE BLD-MCNC: 84 MG/DL (ref 65–99)
GLUCOSE BLD-MCNC: 85 MG/DL (ref 65–99)
GLUCOSE BLD-MCNC: 86 MG/DL (ref 65–99)
GLUCOSE BLD-MCNC: 90 MG/DL (ref 65–99)
GLUCOSE BLD-MCNC: 91 MG/DL (ref 65–99)
GLUCOSE BLD-MCNC: 94 MG/DL (ref 65–99)
GLUCOSE BLD-MCNC: 98 MG/DL (ref 70–99)
HAV IGM SER QL: 1.7 MG/DL (ref 1.7–3)
HCT VFR BLD AUTO: 31.4 % (ref 37–53)
HGB BLD-MCNC: 10.2 G/DL (ref 13–17)
IMMATURE GRANULOCYTE COUNT: 0.04 X10(3) UL (ref 0–1)
IMMATURE GRANULOCYTE RATIO %: 0.4 %
LYMPHOCYTES # BLD AUTO: 0.85 X10(3) UL (ref 0.9–4)
LYMPHOCYTES NFR BLD AUTO: 7.8 %
M PROTEIN MFR SERPL ELPH: 4.8 G/DL (ref 6.1–8.3)
MCH RBC QN AUTO: 32 PG (ref 27–33.2)
MCHC RBC AUTO-ENTMCNC: 32.5 G/DL (ref 31–37)
MCV RBC AUTO: 98.4 FL (ref 80–99)
MONOCYTES # BLD AUTO: 0.83 X10(3) UL (ref 0.1–0.6)
MONOCYTES NFR BLD AUTO: 7.6 %
NEUTROPHIL ABS PRELIM: 9.21 X10 (3) UL (ref 1.3–6.7)
NEUTROPHILS # BLD AUTO: 9.21 X10(3) UL (ref 1.3–6.7)
NEUTROPHILS NFR BLD AUTO: 83.9 %
P AXIS: 33 DEGREES
P-R INTERVAL: 144 MS
PHOSPHATE SERPL-MCNC: 2.6 MG/DL (ref 2.5–4.9)
PLATELET # BLD AUTO: 114 10(3)UL (ref 150–450)
POTASSIUM SERPL-SCNC: 3.6 MMOL/L (ref 3.6–5.1)
Q-T INTERVAL: 392 MS
QRS DURATION: 114 MS
QTC CALCULATION (BEZET): 463 MS
R AXIS: 46 DEGREES
RBC # BLD AUTO: 3.19 X10(6)UL (ref 3.8–5.8)
RED CELL DISTRIBUTION WIDTH-SD: 69.5 FL (ref 35.1–46.3)
SODIUM SERPL-SCNC: 143 MMOL/L (ref 136–144)
T AXIS: -8 DEGREES
TSI SER-ACNC: 3.93 MIU/ML (ref 0.35–5.5)
VENTRICULAR RATE: 84 BPM
WBC # BLD AUTO: 11 X10(3) UL (ref 4–13)

## 2017-02-02 PROCEDURE — C9113 INJ PANTOPRAZOLE SODIUM, VIA: HCPCS | Performed by: NURSE PRACTITIONER

## 2017-02-02 PROCEDURE — 85025 COMPLETE CBC W/AUTO DIFF WBC: CPT | Performed by: INTERNAL MEDICINE

## 2017-02-02 PROCEDURE — 82962 GLUCOSE BLOOD TEST: CPT

## 2017-02-02 PROCEDURE — 97110 THERAPEUTIC EXERCISES: CPT

## 2017-02-02 PROCEDURE — 84100 ASSAY OF PHOSPHORUS: CPT | Performed by: INTERNAL MEDICINE

## 2017-02-02 PROCEDURE — 97116 GAIT TRAINING THERAPY: CPT

## 2017-02-02 PROCEDURE — 80053 COMPREHEN METABOLIC PANEL: CPT | Performed by: INTERNAL MEDICINE

## 2017-02-02 PROCEDURE — 71010 XR CHEST AP PORTABLE  (CPT=71010): CPT

## 2017-02-02 PROCEDURE — 84443 ASSAY THYROID STIM HORMONE: CPT | Performed by: SPECIALIST

## 2017-02-02 PROCEDURE — 83735 ASSAY OF MAGNESIUM: CPT | Performed by: INTERNAL MEDICINE

## 2017-02-02 RX ORDER — NALOXONE HYDROCHLORIDE 0.4 MG/ML
0.08 INJECTION, SOLUTION INTRAMUSCULAR; INTRAVENOUS; SUBCUTANEOUS
Status: DISCONTINUED | OUTPATIENT
Start: 2017-02-02 | End: 2017-02-06

## 2017-02-02 RX ORDER — DIPHENHYDRAMINE HYDROCHLORIDE 50 MG/ML
12.5 INJECTION INTRAMUSCULAR; INTRAVENOUS EVERY 4 HOURS PRN
Status: DISCONTINUED | OUTPATIENT
Start: 2017-02-02 | End: 2017-02-06

## 2017-02-02 RX ORDER — POTASSIUM CHLORIDE 29.8 MG/ML
40 INJECTION INTRAVENOUS ONCE
Status: COMPLETED | OUTPATIENT
Start: 2017-02-02 | End: 2017-02-02

## 2017-02-02 NOTE — PROGRESS NOTES
[address] Patient Status:  Inpatient    1940 MRN [id number]   [facility] 4SW-A Attending Connor Kelly MD   Hosp Day # 2 PCP Michael Waller MD       POD # 2 - s/p cytoreduction with HIPEC (c Carboxyhemoglobin 1.2 0.0-3.0 % SAT   Methemoglobin 0.6 0.4-1.5 % SAT   Ionized Calcium 1.10 (L) 1.12-1.32 mmol/L   Allens Test Positive    Sample Site Right Radial    L/M 5.0 L/min   ABG Device High flow nasal cannula    FIO2 40 %   Potassium Blood Gas 3. Collection Time: 02/02/17  4:35 AM   Result Value Ref Range   WBC 11.0 4.0-13.0 x10(3) uL   RBC 3.19 (L) 3.80-5.80 x10(6)uL   HGB 10.2 (L) 13.0-17.0 g/dL   HCT 31.4 (L) 37.0-53.0 %   .0 (L) 150.0-450.0 10(3)uL   MCV 98.4 80.0-99.0 fL   MCH 32.0 27. 0

## 2017-02-02 NOTE — CONSULTS
[facility]acy Note:  Pain Consult    Wash Drafts is a 64year old male started on Fentanyl PCA by Teresa MORRIS. Pharmacy was consulted to review medication profile and to discontinue previously ordered narcotics and sedatives.     Medication pro

## 2017-02-02 NOTE — CONSULTS
[facility]    PATIENT'S NAME: Bishnu Darron   ATTENDING PHYSICIAN: Deena Castaneda. Agnes Crump MD   CONSULTING PHYSICIAN: Paula Martinez M.D.    PATIENT ACCOUNT#:   [de-identified]    LOCATION:  [address] [place] [address]  MEDICAL RECORD #:   [id number]       DATE OF BIRTH:  [birth year] diffusely diseased with 99% stenosis. The apical LAD filled by collaterals. The right coronary artery was a dominant vessel also and appeared to be a decanalized, totally occluded vessel. His ejection fraction was 30% to 35%.   He was seen in surgical co infarct age undetermined. Per the records, he had an old inferior infarct, even noted in the office with it. Telemetry strips have revealed sinus rhythm. IMPRESSION:    1.    Status post extensive abdominal surgery and debulking with HIPEC instillati

## 2017-02-02 NOTE — PROGRESS NOTES
[facility]  Cardiology Progress Note    Gail Cavazos Patient Status:  Inpatient    1940 MRN [id number]   [facility] 7NE-A Attending Nemo Cervantes MD   Hosp Day # 2 PCP Emily Nguyen MD     Subjective:  No chest pain or shortn 02/02/2017   PHOS 2.6 02/02/2017   PGLU 85 02/02/2017       Allergies:  No Known Allergies    Medications:    Current Facility-Administered Medications:  Naloxone HCl (NARCAN) 0.4 MG/ML injection 0.08 mg 0.08 mg Intravenous Q2 Min PRN   DiphenhydrAMINE HCl

## 2017-02-02 NOTE — PLAN OF CARE
Received patient today from ICU alert and oriented x3. Left DILLAN drain with bloody drainage noted to the dressing. Dressing changed. Both DILLAN's putting out SS fluid. Chest tube to suction, dressing CDI. NG to LIS. Midline incision approximated with akil.  Soham Grey

## 2017-02-02 NOTE — PLAN OF CARE
CARDIOVASCULAR - ADULT    • Maintains optimal cardiac output and hemodynamic stability Progressing          GASTROINTESTINAL - ADULT    • Minimal or absence of nausea and vomiting Progressing    • Maintains or returns to baseline bowel function Progressing

## 2017-02-02 NOTE — PROGRESS NOTES
[facility]  Progress Note    Rufina Norman Patient Status:  Inpatient    1940 MRN [id number]   [facility] 4SW-A Attending Anneliese Varma MD   Hosp Day # 2 PCP Brennon Crump MD     Subjective:  Rufina Norman is a(n) 64 year ol Hyperlipidemia     Peritoneal mesothelioma ([facility] [address]    Assessment and Plan:      · Status post extensive abdominal surgery/debulking and HIPEC instillation for treatment of mesothelioma  · Postoperative right-sided pneumothorax with chest tube in place and de

## 2017-02-02 NOTE — PHYSICAL THERAPY NOTE
PHYSICAL THERAPY TREATMENT NOTE - INPATIENT    Room Number: 451/451-A     Session: 1  Number of Visits to Meet Established Goals: 5    Presenting Problem: S/p HIPEC on 1/31/17    Problem List  Active Problems:    Peritoneal mesothelioma [facility])      Past Me (including a wheelchair)?: A Little   -   Need to walk in hospital room?: A Little   -   Climbing 3-5 steps with a railing?: A Lot    AM-PAC Score:  Raw Score: 17   PT Approx Degree of Impairment Score: 50.57%   Standardized Score (AM-PAC Scale): 42.13   C training;Strengthening;Transfer training;Balance training    CURRENT GOALS      Goal #1  Patient is able to demonstrate supine - sit EOB @ level: modified independent       Goal #2  Patient is able to demonstrate transfers EOB to/from [facility] at assistance lev

## 2017-02-02 NOTE — PROGRESS NOTES
[facility]    Progress Note    Florence Putnam Patient Status:  Inpatient    1940 MRN [id number]   [facility] 7NE-A Attending Eddy Holly MD   Hosp Day # 2 PCP Ilda Mcclain MD       SUBJECTIVE:  Doing good  No flatus    OBJ mcg/mL in sodium chloride 0.9 % 100 mL PCA  Intravenous Continuous   metoprolol Tartrate (LOPRESSOR) 5 MG/5ML injection SOLN 5 mg 5 mg Intravenous Q6H   enalaprilat (VASOTEC) injection 1.25 mg 1.25 mg Intravenous Q6H PRN   sodium chloride 0.45 % 1,000 mL w

## 2017-02-02 NOTE — CM/SW NOTE
Attempted to meet pt for discharge planning eval.  Pt was already enroute to room 7625.   Josh Gomez, 02/02/2017, 2:09 PM

## 2017-02-03 ENCOUNTER — APPOINTMENT (OUTPATIENT)
Dept: GENERAL RADIOLOGY | Facility: HOSPITAL | Age: 77
DRG: 342 | End: 2017-02-03
Attending: INTERNAL MEDICINE
Payer: MEDICARE

## 2017-02-03 ENCOUNTER — APPOINTMENT (OUTPATIENT)
Dept: GENERAL RADIOLOGY | Facility: HOSPITAL | Age: 77
DRG: 342 | End: 2017-02-03
Attending: NURSE PRACTITIONER
Payer: MEDICARE

## 2017-02-03 LAB
ALBUMIN SERPL-MCNC: 2.2 G/DL (ref 3.5–4.8)
ALP LIVER SERPL-CCNC: 57 U/L (ref 45–117)
ALT SERPL-CCNC: 79 U/L (ref 17–63)
AST SERPL-CCNC: 50 U/L (ref 15–41)
BASOPHILS # BLD AUTO: 0.02 X10(3) UL (ref 0–0.1)
BASOPHILS NFR BLD AUTO: 0.2 %
BILIRUB SERPL-MCNC: 0.7 MG/DL (ref 0.1–2)
BUN BLD-MCNC: 11 MG/DL (ref 8–20)
CALCIUM BLD-MCNC: 7.3 MG/DL (ref 8.3–10.3)
CHLORIDE: 106 MMOL/L (ref 101–111)
CO2: 28 MMOL/L (ref 22–32)
CREAT BLD-MCNC: 0.71 MG/DL (ref 0.7–1.3)
EOSINOPHIL # BLD AUTO: 0.03 X10(3) UL (ref 0–0.3)
EOSINOPHIL NFR BLD AUTO: 0.3 %
ERYTHROCYTE [DISTWIDTH] IN BLOOD BY AUTOMATED COUNT: 18.5 % (ref 11.5–16)
GLUCOSE BLD-MCNC: 82 MG/DL (ref 65–99)
GLUCOSE BLD-MCNC: 86 MG/DL (ref 70–99)
GLUCOSE BLD-MCNC: 88 MG/DL (ref 65–99)
HAV IGM SER QL: 1.9 MG/DL (ref 1.7–3)
HCT VFR BLD AUTO: 29.2 % (ref 37–53)
HGB BLD-MCNC: 9.5 G/DL (ref 13–17)
IMMATURE GRANULOCYTE COUNT: 0.03 X10(3) UL (ref 0–1)
IMMATURE GRANULOCYTE RATIO %: 0.3 %
LYMPHOCYTES # BLD AUTO: 0.68 X10(3) UL (ref 0.9–4)
LYMPHOCYTES NFR BLD AUTO: 7.5 %
M PROTEIN MFR SERPL ELPH: 5.1 G/DL (ref 6.1–8.3)
MCH RBC QN AUTO: 32.4 PG (ref 27–33.2)
MCHC RBC AUTO-ENTMCNC: 32.5 G/DL (ref 31–37)
MCV RBC AUTO: 99.7 FL (ref 80–99)
MONOCYTES # BLD AUTO: 0.57 X10(3) UL (ref 0.1–0.6)
MONOCYTES NFR BLD AUTO: 6.3 %
NEUTROPHIL ABS PRELIM: 7.78 X10 (3) UL (ref 1.3–6.7)
NEUTROPHILS # BLD AUTO: 7.78 X10(3) UL (ref 1.3–6.7)
NEUTROPHILS NFR BLD AUTO: 85.4 %
PHOSPHATE SERPL-MCNC: 1.1 MG/DL (ref 2.5–4.9)
PHOSPHATE SERPL-MCNC: 1.6 MG/DL (ref 2.5–4.9)
PLATELET # BLD AUTO: 112 10(3)UL (ref 150–450)
POTASSIUM SERPL-SCNC: 3.7 MMOL/L (ref 3.6–5.1)
POTASSIUM SERPL-SCNC: 3.7 MMOL/L (ref 3.6–5.1)
RBC # BLD AUTO: 2.93 X10(6)UL (ref 3.8–5.8)
RED CELL DISTRIBUTION WIDTH-SD: 67.8 FL (ref 35.1–46.3)
SODIUM SERPL-SCNC: 139 MMOL/L (ref 136–144)
WBC # BLD AUTO: 9.1 X10(3) UL (ref 4–13)

## 2017-02-03 PROCEDURE — 84100 ASSAY OF PHOSPHORUS: CPT | Performed by: INTERNAL MEDICINE

## 2017-02-03 PROCEDURE — 71010 XR CHEST AP PORTABLE  (CPT=71010): CPT

## 2017-02-03 PROCEDURE — 85025 COMPLETE CBC W/AUTO DIFF WBC: CPT | Performed by: INTERNAL MEDICINE

## 2017-02-03 PROCEDURE — 83735 ASSAY OF MAGNESIUM: CPT | Performed by: SURGERY

## 2017-02-03 PROCEDURE — 84132 ASSAY OF SERUM POTASSIUM: CPT | Performed by: SURGERY

## 2017-02-03 PROCEDURE — 82962 GLUCOSE BLOOD TEST: CPT

## 2017-02-03 PROCEDURE — C9113 INJ PANTOPRAZOLE SODIUM, VIA: HCPCS | Performed by: NURSE PRACTITIONER

## 2017-02-03 PROCEDURE — 97110 THERAPEUTIC EXERCISES: CPT

## 2017-02-03 PROCEDURE — 71020 XR CHEST PA + LAT CHEST (CPT=71020): CPT

## 2017-02-03 PROCEDURE — 80053 COMPREHEN METABOLIC PANEL: CPT | Performed by: INTERNAL MEDICINE

## 2017-02-03 RX ORDER — POTASSIUM CHLORIDE 14.9 MG/ML
20 INJECTION INTRAVENOUS ONCE
Status: COMPLETED | OUTPATIENT
Start: 2017-02-03 | End: 2017-02-03

## 2017-02-03 RX ORDER — DEXTROSE, SODIUM CHLORIDE, AND POTASSIUM CHLORIDE 5; .45; .15 G/100ML; G/100ML; G/100ML
INJECTION INTRAVENOUS CONTINUOUS
Status: DISCONTINUED | OUTPATIENT
Start: 2017-02-03 | End: 2017-02-04

## 2017-02-03 NOTE — CM/SW NOTE
Pt seen to set up [facility]. Pt had surgery and HIPEC with Dr Clifton Han for peritoneal mesothelioma on 1/31. Pt lives with his wife in a ranch-style home. They have a dtr living in [facility] and another dtr in [place].   Pt is usually independent for his adls and walks un

## 2017-02-03 NOTE — PHYSICAL THERAPY NOTE
PHYSICAL THERAPY TREATMENT NOTE - INPATIENT    Room Number: 9975/8793-N     Session: 2   Number of Visits to Meet Established Goals: 5    Presenting Problem: S/p HIPEC on 1/31/17    Problem List  Active Problems:    Peritoneal mesothelioma [facility])      Past Score: 17   PT Approx Degree of Impairment Score: 50.57%   Standardized Score (AM-PAC Scale): 42.13   CMS Modifier (G-Code): CK    FUNCTIONAL ABILITY STATUS  Gait Assessment   Gait Assistance: Not tested  Distance (ft): 0  Assistive Device: Rolling walker 2/3/17

## 2017-02-03 NOTE — DIETARY NOTE
[address] ASSESSMENT    Pt is at moderate nutrition risk. Pt does not meet malnutrition criteria.     NUTRITION DIAGNOSIS/PROBLEM:    Inadequate oral intake related to decreased ability to consume sufficient energy as evidenced by 3064-2404 calories/day (25-30 calories per kg)  Protein:  grams protein/day (1.3-1.5 grams protein per kg)  Fluid: ~1 ml/kcal or per MD discretion    MONITOR AND EVALUATE/NUTRITION GOALS:    1. PO intake to meet at least 75% patient nutrition prescri

## 2017-02-03 NOTE — PLAN OF CARE
POD#3  Pt A/Ox4, forgetful at times, on 3L [place], NSR per tele  Fentanyl PCA Pump in place, pt states pain is under control  R Chest tube to suction draining serosanguinous output  R DILLAN drain in place, draining serosanguinous output   L DILLAN drain in [place]

## 2017-02-03 NOTE — PROGRESS NOTES
[facility]    Progress Note    Yosi Georges Patient Status:  Inpatient    1940 MRN [id number]   [facility] 7NE-A Attending Connor Kelly MD   Hosp Day # 3 PCP Michael Waller MD     Subjective:     Respiratory: Negative for c catheter. Dictated by: Chitra Saldaña DO on 2/03/2017 at 8:53     Approved by: Chitra Saldaña DO            Xr Chest Ap Portable  (cpt=71010)    2/3/2017  ADDENDUM:  The power port tip is in the SVC.   Dictated by: Jose Smith MD on 2/03/2017 at 8:32     A

## 2017-02-03 NOTE — PROGRESS NOTES
[facility] Pulmonary/Critical Care Progress Note     SUBJECTIVE/24H Events: All events, procedures, notes reviewed. Doing well      Review of Systems:   A comprehensive 14 point review of systems was completed.    Pertin for input(s): BNP in the last 72 hours. Recent Labs   Lab  02/01/17   0434   TROP  <0.046   CK  385*     No results for input(s): PT, INR, PTT in the last 72 hours.   Recent Labs   Lab  02/01/17   1600   ABGPHT  7.33*   PHYQOX2D  38   ATZWC3A  96   ABGHCO3 Right pneumothorax now measures up to 2.4 cm as compared to 4.3 cm on the previous exam. Stable airspace disease throughout the right lung and in the retrocardiac left lower lobe. Stable small right pleural effusion    Dictated by:  Benoit Singletary MD on 2/0 ipratropium-albuterol    ASSESSMENT    · Peritoneal mesothelioma s/ cytoreductive surgery and HIPEC 1/31/17  · Iatro PTX s/p chest tube  · CAD/CHF  · HTN    PLAN    · Continue routine post-operative management  · CT management per surgery  · Ambulate, adva

## 2017-02-03 NOTE — PROGRESS NOTES
[address] Patient Status:  Inpatient    1940 MRN [id number]   [facility] 7NE-A Attending Connor Kelly MD   Hosp Day # 3 PCP Michael Waller MD       POD # 3    Feeling hungry  No bowel funct Glucose 86 70-99 mg/dL   BUN 11 8-20 mg/dL   Creatinine 0.71 0.70-1.30 mg/dL   GFR 90 >=60   Calcium, Total 7.3 (L) 8.3-10.3 mg/dL   Alkaline Phosphatase 57  U/L   AST 50 (H) 15-41 U/L   Alt 79 (H) 17-63 U/L   Bilirubin, Total 0.7 0.1-2.0 mg/dL   [address] [facility], [facility]  [address] [facility].   [place], [address] [place] [address]  T: [phone]  F: [phone]

## 2017-02-04 ENCOUNTER — APPOINTMENT (OUTPATIENT)
Dept: GENERAL RADIOLOGY | Facility: HOSPITAL | Age: 77
DRG: 342 | End: 2017-02-04
Attending: SURGERY
Payer: MEDICARE

## 2017-02-04 LAB
ALBUMIN SERPL-MCNC: 2.2 G/DL (ref 3.5–4.8)
ALP LIVER SERPL-CCNC: 59 U/L (ref 45–117)
ALT SERPL-CCNC: 58 U/L (ref 17–63)
AST SERPL-CCNC: 33 U/L (ref 15–41)
BASOPHILS # BLD AUTO: 0.02 X10(3) UL (ref 0–0.1)
BASOPHILS NFR BLD AUTO: 0.2 %
BILIRUB SERPL-MCNC: 0.7 MG/DL (ref 0.1–2)
BUN BLD-MCNC: 11 MG/DL (ref 8–20)
CALCIUM BLD-MCNC: 7.5 MG/DL (ref 8.3–10.3)
CHLORIDE: 105 MMOL/L (ref 101–111)
CO2: 27 MMOL/L (ref 22–32)
CREAT BLD-MCNC: 0.7 MG/DL (ref 0.7–1.3)
EOSINOPHIL # BLD AUTO: 0.06 X10(3) UL (ref 0–0.3)
EOSINOPHIL NFR BLD AUTO: 0.7 %
ERYTHROCYTE [DISTWIDTH] IN BLOOD BY AUTOMATED COUNT: 17.9 % (ref 11.5–16)
GLUCOSE BLD-MCNC: 107 MG/DL (ref 65–99)
GLUCOSE BLD-MCNC: 112 MG/DL (ref 70–99)
GLUCOSE BLD-MCNC: 114 MG/DL (ref 65–99)
GLUCOSE BLD-MCNC: 98 MG/DL (ref 65–99)
HCT VFR BLD AUTO: 34.5 % (ref 37–53)
HGB BLD-MCNC: 11.3 G/DL (ref 13–17)
IMMATURE GRANULOCYTE COUNT: 0.03 X10(3) UL (ref 0–1)
IMMATURE GRANULOCYTE RATIO %: 0.3 %
LYMPHOCYTES # BLD AUTO: 0.64 X10(3) UL (ref 0.9–4)
LYMPHOCYTES NFR BLD AUTO: 7.2 %
M PROTEIN MFR SERPL ELPH: 5.3 G/DL (ref 6.1–8.3)
MCH RBC QN AUTO: 32 PG (ref 27–33.2)
MCHC RBC AUTO-ENTMCNC: 32.8 G/DL (ref 31–37)
MCV RBC AUTO: 97.7 FL (ref 80–99)
MONOCYTES # BLD AUTO: 0.47 X10(3) UL (ref 0.1–0.6)
MONOCYTES NFR BLD AUTO: 5.3 %
NEUTROPHIL ABS PRELIM: 7.72 X10 (3) UL (ref 1.3–6.7)
NEUTROPHILS # BLD AUTO: 7.72 X10(3) UL (ref 1.3–6.7)
NEUTROPHILS NFR BLD AUTO: 86.3 %
PHOSPHATE SERPL-MCNC: 2.8 MG/DL (ref 2.5–4.9)
PLATELET # BLD AUTO: 149 10(3)UL (ref 150–450)
POTASSIUM SERPL-SCNC: 3.5 MMOL/L (ref 3.6–5.1)
POTASSIUM SERPL-SCNC: 3.5 MMOL/L (ref 3.6–5.1)
RBC # BLD AUTO: 3.53 X10(6)UL (ref 3.8–5.8)
RED CELL DISTRIBUTION WIDTH-SD: 63.8 FL (ref 35.1–46.3)
SODIUM SERPL-SCNC: 139 MMOL/L (ref 136–144)
WBC # BLD AUTO: 8.9 X10(3) UL (ref 4–13)

## 2017-02-04 PROCEDURE — 97166 OT EVAL MOD COMPLEX 45 MIN: CPT

## 2017-02-04 PROCEDURE — 97110 THERAPEUTIC EXERCISES: CPT

## 2017-02-04 PROCEDURE — 84132 ASSAY OF SERUM POTASSIUM: CPT | Performed by: INTERNAL MEDICINE

## 2017-02-04 PROCEDURE — C9113 INJ PANTOPRAZOLE SODIUM, VIA: HCPCS | Performed by: NURSE PRACTITIONER

## 2017-02-04 PROCEDURE — 85025 COMPLETE CBC W/AUTO DIFF WBC: CPT | Performed by: NURSE PRACTITIONER

## 2017-02-04 PROCEDURE — 82962 GLUCOSE BLOOD TEST: CPT

## 2017-02-04 PROCEDURE — 71020 XR CHEST PA + LAT CHEST (CPT=71020): CPT

## 2017-02-04 PROCEDURE — 97530 THERAPEUTIC ACTIVITIES: CPT

## 2017-02-04 PROCEDURE — 84100 ASSAY OF PHOSPHORUS: CPT | Performed by: INTERNAL MEDICINE

## 2017-02-04 PROCEDURE — 97116 GAIT TRAINING THERAPY: CPT

## 2017-02-04 PROCEDURE — 80053 COMPREHEN METABOLIC PANEL: CPT | Performed by: NURSE PRACTITIONER

## 2017-02-04 RX ORDER — BISACODYL 10 MG
10 SUPPOSITORY, RECTAL RECTAL ONCE
Status: COMPLETED | OUTPATIENT
Start: 2017-02-04 | End: 2017-02-04

## 2017-02-04 RX ORDER — HYDROCODONE BITARTRATE AND ACETAMINOPHEN 5; 325 MG/1; MG/1
1-2 TABLET ORAL EVERY 6 HOURS PRN
Status: DISCONTINUED | OUTPATIENT
Start: 2017-02-04 | End: 2017-02-06

## 2017-02-04 RX ORDER — POTASSIUM CHLORIDE 29.8 MG/ML
40 INJECTION INTRAVENOUS ONCE
Status: COMPLETED | OUTPATIENT
Start: 2017-02-04 | End: 2017-02-04

## 2017-02-04 RX ORDER — HYDROMORPHONE HYDROCHLORIDE 1 MG/ML
0.5 INJECTION, SOLUTION INTRAMUSCULAR; INTRAVENOUS; SUBCUTANEOUS EVERY 4 HOURS PRN
Status: DISCONTINUED | OUTPATIENT
Start: 2017-02-04 | End: 2017-02-06

## 2017-02-04 NOTE — PROGRESS NOTES
[facility] remains  Progress Note    Chanel Moscoso Patient Status:  Inpatient    1940 MRN [id number]   [facility] 7NE-A Attending Izzy Mayen MD   Hosp Day # 4 PCP Ridge Levine MD     Subjective:  Chanel Moscoso is a(n) 64 tube-management as per surgical service with decreasing pleural separation on waterseal  · CAD/CHF  · HTN      Continuing to follow for occasional leak noted on chest tube  Chest x-ray in the morning  Continuing to increase activity  Following    Ruther Headings

## 2017-02-04 NOTE — PHYSICAL THERAPY NOTE
Attempted treatment, however pt in the bathroom and returning to bed. Pt and son state pt took a walk this morning and pt wants to rest.  Will return this afternoon for treatment.

## 2017-02-04 NOTE — PLAN OF CARE
POD#4  Pt A/Ox4, forgetful at times, on RA sats at 94-96%, NSR per tele  Pt up and ambulating in room and pelayo, OOB for all meals  R Chest tube to water seal draining serosanguinous output  R DILLAN drain in place, draining serosanguinous output    L DILLAN drain

## 2017-02-04 NOTE — PHYSICAL THERAPY NOTE
PHYSICAL THERAPY TREATMENT NOTE - INPATIENT    Room Number: 6261/4136-E     Session: 3   Number of Visits to Meet Established Goals: 5    Presenting Problem: S/p HIPEC on 1/31/17    Problem List  Active Problems:    Peritoneal mesothelioma [facility])      Past Lot    AM-PAC Score:  Raw Score: 17   PT Approx Degree of Impairment Score: 50.57%   Standardized Score (AM-PAC Scale): 42.13   CMS Modifier (G-Code): CK    FUNCTIONAL ABILITY STATUS  Gait Assessment   Gait Assistance: Supervision  Distance (ft): 300  Assi Goal #6          Goal Comments: Goals established on 2/1/2017  All goals remain ongoing 2/4/17

## 2017-02-04 NOTE — PLAN OF CARE
Assumed care at [facility]. Pt A/O x3-4. Forgetful at times. 3L of O2 per NC. NSR on tele. CXR resulted, notified Dr. Oleg Eddy of results. Order given for CXR again in the morning.   While emptying pt DILLAN drains, Chest tubing was found to be disconnected from pleu

## 2017-02-04 NOTE — PROGRESS NOTES
[facility]    Progress Note    Chanel Moscoso Patient Status:  Inpatient    1940 MRN [id number]   [facility] 7NE-A Attending Izzy Mayen MD   Hosp Day # 4 PCP Ridge Levine MD       SUBJECTIVE:  No new complaints, eating so Q2 Min PRN   DiphenhydrAMINE HCl (BENADRYL) injection 12.5 mg 12.5 mg Intravenous Q4H PRN   metoprolol Tartrate (LOPRESSOR) 5 MG/5ML injection SOLN 5 mg 5 mg Intravenous Q6H   enalaprilat (VASOTEC) injection 1.25 mg 1.25 mg Intravenous Q6H PRN   Pantoprazo

## 2017-02-04 NOTE — PROGRESS NOTES
POD#4  Feels well  Had BMs  Abdomen NT  Advance diet   Anticipate d/c chest tube tomorrow  Anticipate d/c home Mon with Marti Darling MD

## 2017-02-04 NOTE — OCCUPATIONAL THERAPY NOTE
OCCUPATIONAL THERAPY EVALUATION - INPATIENT     Room Number: 2186/9276-J  Evaluation Date: 2/4/2017  Type of Evaluation: Initial  Presenting Problem: HIPEC, R pleural effusion    Physician Order: IP Consult to Occupational Therapy  Reason for Therapy: ADL/ ASSESSMENT  Upper extremity ROM is within functional limits     Upper extremity strength is within functional limits     COORDINATION  Gross Motor    WFL    Fine Motor    WFL      ADDITIONAL TESTS                       Other Test(s):  (Modified Barthell In techniques  These deficits manifest functionally while performing self care, functional mobiliy.   The patient is below baseline and would benefit from skilled inpatient OT to address the above deficits, maximizing patient's ability to return to prior level

## 2017-02-04 NOTE — PROGRESS NOTES
[address] Patient Status:  Inpatient    1940 MRN [id number]   [facility] 7NE-A Attending Argenis Yancey MD   Hosp Day # 4 PCP Thomas Pablo MD       POD # 4    Feeling hungry  + flatus  Pain Collection Time: 02/03/17  8:29 PM   Result Value Ref Range   POC Glucose 88 65-99 mg/dL   -POTASSIUM   Collection Time: 02/04/17  6:00 AM   Result Value Ref Range   Potassium 3.5 (L) 3.6-5.1 mmol/L   -COMP METABOLIC PANEL (14)   Collection Time: 02/04/17 [address] [facility], [address] [facility]ICS  [address] [facility].   [place], [address] [place] [address]  T: [phone]  F: [phone]

## 2017-02-05 ENCOUNTER — APPOINTMENT (OUTPATIENT)
Dept: GENERAL RADIOLOGY | Facility: HOSPITAL | Age: 77
DRG: 342 | End: 2017-02-05
Attending: SURGERY
Payer: MEDICARE

## 2017-02-05 LAB
ALBUMIN SERPL-MCNC: 2.1 G/DL (ref 3.5–4.8)
ALP LIVER SERPL-CCNC: 57 U/L (ref 45–117)
ALT SERPL-CCNC: 40 U/L (ref 17–63)
AST SERPL-CCNC: 23 U/L (ref 15–41)
BASOPHILS # BLD AUTO: 0.03 X10(3) UL (ref 0–0.1)
BASOPHILS NFR BLD AUTO: 0.5 %
BILIRUB SERPL-MCNC: 0.5 MG/DL (ref 0.1–2)
BUN BLD-MCNC: 16 MG/DL (ref 8–20)
CALCIUM BLD-MCNC: 7.2 MG/DL (ref 8.3–10.3)
CHLORIDE: 109 MMOL/L (ref 101–111)
CO2: 26 MMOL/L (ref 22–32)
CREAT BLD-MCNC: 0.77 MG/DL (ref 0.7–1.3)
EOSINOPHIL # BLD AUTO: 0.05 X10(3) UL (ref 0–0.3)
EOSINOPHIL NFR BLD AUTO: 0.8 %
ERYTHROCYTE [DISTWIDTH] IN BLOOD BY AUTOMATED COUNT: 18 % (ref 11.5–16)
GLUCOSE BLD-MCNC: 88 MG/DL (ref 65–99)
GLUCOSE BLD-MCNC: 92 MG/DL (ref 65–99)
GLUCOSE BLD-MCNC: 94 MG/DL (ref 65–99)
GLUCOSE BLD-MCNC: 94 MG/DL (ref 70–99)
GLUCOSE BLD-MCNC: 99 MG/DL (ref 65–99)
HCT VFR BLD AUTO: 29.9 % (ref 37–53)
HGB BLD-MCNC: 10.1 G/DL (ref 13–17)
IMMATURE GRANULOCYTE COUNT: 0.02 X10(3) UL (ref 0–1)
IMMATURE GRANULOCYTE RATIO %: 0.3 %
LYMPHOCYTES # BLD AUTO: 1.14 X10(3) UL (ref 0.9–4)
LYMPHOCYTES NFR BLD AUTO: 17.6 %
M PROTEIN MFR SERPL ELPH: 4.8 G/DL (ref 6.1–8.3)
MCH RBC QN AUTO: 32.7 PG (ref 27–33.2)
MCHC RBC AUTO-ENTMCNC: 33.8 G/DL (ref 31–37)
MCV RBC AUTO: 96.8 FL (ref 80–99)
MONOCYTES # BLD AUTO: 0.46 X10(3) UL (ref 0.1–0.6)
MONOCYTES NFR BLD AUTO: 7.1 %
NEUTROPHIL ABS PRELIM: 4.77 X10 (3) UL (ref 1.3–6.7)
NEUTROPHILS # BLD AUTO: 4.77 X10(3) UL (ref 1.3–6.7)
NEUTROPHILS NFR BLD AUTO: 73.7 %
PLATELET # BLD AUTO: 142 10(3)UL (ref 150–450)
POTASSIUM SERPL-SCNC: 3.6 MMOL/L (ref 3.6–5.1)
POTASSIUM SERPL-SCNC: 3.6 MMOL/L (ref 3.6–5.1)
POTASSIUM SERPL-SCNC: 3.7 MMOL/L (ref 3.6–5.1)
RBC # BLD AUTO: 3.09 X10(6)UL (ref 3.8–5.8)
RED CELL DISTRIBUTION WIDTH-SD: 64.2 FL (ref 35.1–46.3)
SODIUM SERPL-SCNC: 141 MMOL/L (ref 136–144)
WBC # BLD AUTO: 6.5 X10(3) UL (ref 4–13)

## 2017-02-05 PROCEDURE — C9113 INJ PANTOPRAZOLE SODIUM, VIA: HCPCS | Performed by: NURSE PRACTITIONER

## 2017-02-05 PROCEDURE — 84132 ASSAY OF SERUM POTASSIUM: CPT | Performed by: INTERNAL MEDICINE

## 2017-02-05 PROCEDURE — 71010 XR CHEST AP PORTABLE  (CPT=71010): CPT

## 2017-02-05 PROCEDURE — 85025 COMPLETE CBC W/AUTO DIFF WBC: CPT | Performed by: SURGERY

## 2017-02-05 PROCEDURE — 80053 COMPREHEN METABOLIC PANEL: CPT | Performed by: SURGERY

## 2017-02-05 PROCEDURE — 82962 GLUCOSE BLOOD TEST: CPT

## 2017-02-05 RX ORDER — POTASSIUM CHLORIDE 20 MEQ/1
40 TABLET, EXTENDED RELEASE ORAL EVERY 4 HOURS
Status: COMPLETED | OUTPATIENT
Start: 2017-02-05 | End: 2017-02-05

## 2017-02-05 RX ORDER — POTASSIUM CHLORIDE 14.9 MG/ML
20 INJECTION INTRAVENOUS ONCE
Status: COMPLETED | OUTPATIENT
Start: 2017-02-05 | End: 2017-02-05

## 2017-02-05 NOTE — PLAN OF CARE
Assumed care at 1. Pt A/O x4. Confused at times. RA overnight sats at 90-91%. NSR on tele. Pt up in the room to the bathroom, BM x1. R chest tube to water seal.   R DILLAN drain in place. Draining serosanguinous output. L DILLAN drain in place.  Draining ser

## 2017-02-05 NOTE — PROGRESS NOTES
[facility]  Progress Note    Gogo Perkins Patient Status:  Inpatient    1940 MRN [id number]   [facility] 7NE-A Attending Cely Gross MD   Hosp Day # 5 PCP Corina Garcia MD     Subjective:  Gogo Perkins is a(n) 64 year ol chest tube chest tube now removed  · CAD/CHF  · HTN  · Nutrition with improved p.o. intake    Continues to improve  Replete potassium  Follow-up chest x-ray pending  Anticipate discharge 1-2 days    Michael Goyal MD  2/5/2017  12:39 PM

## 2017-02-05 NOTE — PROGRESS NOTES
[facility]    Progress Note    Jn Beal Patient Status:  Inpatient    1940 MRN [id number]   [facility] 7NE-A Attending Ana Orellana MD   Hosp Day # 5 PCP Sanjuana Andrade MD       SUBJECTIVE:  No new events    OBJECTIVE: MG/ML injection 0.08 mg 0.08 mg Intravenous Q2 Min PRN   DiphenhydrAMINE HCl (BENADRYL) injection 12.5 mg 12.5 mg Intravenous Q4H PRN   metoprolol Tartrate (LOPRESSOR) 5 MG/5ML injection SOLN 5 mg 5 mg Intravenous Q6H   enalaprilat (VASOTEC) injection 1.25

## 2017-02-05 NOTE — PLAN OF CARE
Assumed care @ 0700. Pt a/o x4, VSS. Tele SR. Chest Tube removed per MD order, CXR (-) for acute pneumothorax. No acute respiratory distress noted. Denies any pain. Pt ambulated to the bathroom.  (+)BM. DILLAN drainage output monitored.   Anticipated to

## 2017-02-05 NOTE — PROGRESS NOTES
[address] Patient Status:  Inpatient    1940 MRN [id number]   [facility] 7NE-A Attending Harley Tanner MD   Hosp Day # 5 PCP Pierce Weldon MD       POD # 5    Feeling hungry  + flatus  Pain Collection Time: 02/05/17  4:57 AM   Result Value Ref Range   Potassium 3.6 3.6-5.1 mmol/L   -COMP METABOLIC PANEL (14)   Collection Time: 02/05/17  4:57 AM   Result Value Ref Range   Glucose 94 70-99 mg/dL   BUN 16 8-20 mg/dL   Creatinine 0.77 0.70-1.30 m [address] [place] [address] Primitivo.   [place], [address] [place] [address]  T: [phone]  F: [phone]

## 2017-02-06 VITALS
SYSTOLIC BLOOD PRESSURE: 159 MMHG | WEIGHT: 145.31 LBS | OXYGEN SATURATION: 95 % | HEIGHT: 65 IN | DIASTOLIC BLOOD PRESSURE: 73 MMHG | HEART RATE: 57 BPM | BODY MASS INDEX: 24.21 KG/M2 | TEMPERATURE: 98 F | RESPIRATION RATE: 18 BRPM

## 2017-02-06 LAB
ALBUMIN SERPL-MCNC: 2.1 G/DL (ref 3.5–4.8)
ALP LIVER SERPL-CCNC: 58 U/L (ref 45–117)
ALT SERPL-CCNC: 34 U/L (ref 17–63)
AST SERPL-CCNC: 18 U/L (ref 15–41)
BASOPHILS # BLD AUTO: 0.03 X10(3) UL (ref 0–0.1)
BASOPHILS NFR BLD AUTO: 0.5 %
BILIRUB SERPL-MCNC: 0.6 MG/DL (ref 0.1–2)
BUN BLD-MCNC: 15 MG/DL (ref 8–20)
CALCIUM BLD-MCNC: 7.4 MG/DL (ref 8.3–10.3)
CHLORIDE: 109 MMOL/L (ref 101–111)
CO2: 25 MMOL/L (ref 22–32)
CREAT BLD-MCNC: 0.79 MG/DL (ref 0.7–1.3)
EOSINOPHIL # BLD AUTO: 0.15 X10(3) UL (ref 0–0.3)
EOSINOPHIL NFR BLD AUTO: 2.7 %
ERYTHROCYTE [DISTWIDTH] IN BLOOD BY AUTOMATED COUNT: 18 % (ref 11.5–16)
GLUCOSE BLD-MCNC: 83 MG/DL (ref 65–99)
GLUCOSE BLD-MCNC: 84 MG/DL (ref 65–99)
GLUCOSE BLD-MCNC: 87 MG/DL (ref 70–99)
HCT VFR BLD AUTO: 29.9 % (ref 37–53)
HGB BLD-MCNC: 9.7 G/DL (ref 13–17)
IMMATURE GRANULOCYTE COUNT: 0.03 X10(3) UL (ref 0–1)
IMMATURE GRANULOCYTE RATIO %: 0.5 %
LYMPHOCYTES # BLD AUTO: 1.07 X10(3) UL (ref 0.9–4)
LYMPHOCYTES NFR BLD AUTO: 18.9 %
M PROTEIN MFR SERPL ELPH: 5 G/DL (ref 6.1–8.3)
MCH RBC QN AUTO: 32.3 PG (ref 27–33.2)
MCHC RBC AUTO-ENTMCNC: 32.4 G/DL (ref 31–37)
MCV RBC AUTO: 99.7 FL (ref 80–99)
MONOCYTES # BLD AUTO: 0.45 X10(3) UL (ref 0.1–0.6)
MONOCYTES NFR BLD AUTO: 8 %
NEUTROPHIL ABS PRELIM: 3.92 X10 (3) UL (ref 1.3–6.7)
NEUTROPHILS # BLD AUTO: 3.92 X10(3) UL (ref 1.3–6.7)
NEUTROPHILS NFR BLD AUTO: 69.4 %
PLATELET # BLD AUTO: 156 10(3)UL (ref 150–450)
POTASSIUM SERPL-SCNC: 3.7 MMOL/L (ref 3.6–5.1)
POTASSIUM SERPL-SCNC: 3.7 MMOL/L (ref 3.6–5.1)
RBC # BLD AUTO: 3 X10(6)UL (ref 3.8–5.8)
RED CELL DISTRIBUTION WIDTH-SD: 65.3 FL (ref 35.1–46.3)
SODIUM SERPL-SCNC: 142 MMOL/L (ref 136–144)
WBC # BLD AUTO: 5.7 X10(3) UL (ref 4–13)

## 2017-02-06 PROCEDURE — 82962 GLUCOSE BLOOD TEST: CPT

## 2017-02-06 PROCEDURE — 97116 GAIT TRAINING THERAPY: CPT

## 2017-02-06 PROCEDURE — 97530 THERAPEUTIC ACTIVITIES: CPT

## 2017-02-06 PROCEDURE — 85025 COMPLETE CBC W/AUTO DIFF WBC: CPT | Performed by: SURGERY

## 2017-02-06 PROCEDURE — 80053 COMPREHEN METABOLIC PANEL: CPT | Performed by: INTERNAL MEDICINE

## 2017-02-06 PROCEDURE — 84132 ASSAY OF SERUM POTASSIUM: CPT | Performed by: INTERNAL MEDICINE

## 2017-02-06 PROCEDURE — C9113 INJ PANTOPRAZOLE SODIUM, VIA: HCPCS | Performed by: NURSE PRACTITIONER

## 2017-02-06 RX ORDER — POTASSIUM CHLORIDE 14.9 MG/ML
20 INJECTION INTRAVENOUS ONCE
Status: COMPLETED | OUTPATIENT
Start: 2017-02-06 | End: 2017-02-06

## 2017-02-06 RX ORDER — POTASSIUM CHLORIDE 20 MEQ/1
20 TABLET, EXTENDED RELEASE ORAL 2 TIMES DAILY
Qty: 30 TABLET | Refills: 0 | Status: SHIPPED | OUTPATIENT
Start: 2017-02-06

## 2017-02-06 RX ORDER — HYDROCODONE BITARTRATE AND ACETAMINOPHEN 5; 325 MG/1; MG/1
1 TABLET ORAL EVERY 6 HOURS PRN
Qty: 40 TABLET | Refills: 0 | Status: SHIPPED | OUTPATIENT
Start: 2017-02-06 | End: 2017-02-10

## 2017-02-06 NOTE — CM/SW NOTE
Pt is ready for d/c today. Pt will go home with [facility]. Called Yfn at [facility] to notify him of pt's d/c today. They will see him tomorrow. D/C summary sent to [facility] via [facility].

## 2017-02-06 NOTE — PHYSICAL THERAPY NOTE
PHYSICAL THERAPY TREATMENT NOTE - INPATIENT    Room Number: 5194/7804-A     Session: 4   Number of Visits to Meet Established Goals: 5    Presenting Problem: S/p HIPEC on 1/31/17    Problem List  Active Problems:    Peritoneal mesothelioma [facility])      Past Approx Degree of Impairment Score: 20.91%   Standardized Score (AM-PAC Scale): 53.28   CMS Modifier (G-Code): CJ    FUNCTIONAL ABILITY STATUS  Gait Assessment   Gait Assistance: Supervision  Distance (ft): 200  Assistive Device: None  Pattern: Within [employer]

## 2017-02-06 NOTE — DIETARY NOTE
Nutrition Short Note    Patient educated on post-op diet recommendations this am. Discussed the recommendation for low fiber diet, low and high fiber foods, rationale for low fiber diet, and high protein food choices. All questions answered. Pt receptive.

## 2017-02-06 NOTE — PROGRESS NOTES
[facility]  Progress Note    Fermin Albert Patient Status:  Inpatient    1940 MRN [id number]   [facility] 7NE-A Attending Sophia Andrade MD   Hosp Day # 6 PCP Sanjuana Bueno MD     ASSESSMENT      · Peritoneal mesothelioma s/ 02/06/17  0557   WBC 8.9 6.5 5.7   HGB 11.3* 10.1* 9.7*   .0* 142.0* 156.0       Recent Labs   Lab  02/04/17   0600  02/05/17   0457  02/05/17   1740  02/06/17   0557   GLU  112*  94   --   87   BUN  11  16   --   15   CREATSERUM  0.70  0.77   -- Narinder  2/6/2017  1:46 PM

## 2017-02-06 NOTE — PROGRESS NOTES
[facility]  Cardiology Progress Note    Declan Ray Patient Status:  Inpatient    1940 MRN [id number]   [facility] 7NE-A Attending Pascual Cam MD   Hosp Day # 6 PCP Tyrel Alexandra MD     Subjective:  No chest pain or shortn 02/06/2017   CA 7.4 02/06/2017   ALB 2.1 02/06/2017   ALKPHO 58 02/06/2017   BILT 0.6 02/06/2017   TP 5.0 02/06/2017   AST 18 02/06/2017   ALT 34 02/06/2017   PGLU 83 02/06/2017       Allergies:  No Known Allergies    Medications:    Current Facility-Admin

## 2017-02-06 NOTE — PLAN OF CARE
Assumed care @ 0700. Pt a/o x4, VSS. Tele SR. No acute respiratory distress noted. Denies any pain. Pt ambulated to the bathoom and w/ PT.  (+)BM. DILLAN output monitored. No other complaints made. Will continue to monitor.           NURSING DISCHARGE N

## 2017-02-06 NOTE — PROGRESS NOTES
[facility]    Progress Note    [facility] Patient Status:  Inpatient    1940 MRN [id number]   [facility] 7NE-A Attending Poornima Frey MD   Hosp Day # 6 PCP Kg Haynes MD       SUBJECTIVE:  No new events  To be dischar 0.4 MG/ML injection 0.08 mg 0.08 mg Intravenous Q2 Min PRN   DiphenhydrAMINE HCl (BENADRYL) injection 12.5 mg 12.5 mg Intravenous Q4H PRN   metoprolol Tartrate (LOPRESSOR) 5 MG/5ML injection SOLN 5 mg 5 mg Intravenous Q6H   enalaprilat (VASOTEC) injection

## 2017-02-07 NOTE — DISCHARGE SUMMARY
[facility]  Discharge Summary    Savanna Petersen Patient Status:  Inpatient    1940 MRN [id number]   [facility] 7NE-A Attending No att. providers found   Hosp Day # 6 PCP Bety Ma MD     Date of Admission: 2017    Date Was tolerating diet and pain was controlled at discharge.     Consultations: Intensivist  hospitalist cardiologist    Procedures: cytoreductive surgery with HIPEC chemotherapy    Complications: none    Disposition: Home or Self Care    Discharge Condition:

## 2017-02-07 NOTE — CM/SW NOTE
02/07/17 0900   Discharge disposition   Discharged to: Home-Health   Name of [address] services after discharge Skilled home care   Discharge transportation Private car

## 2017-02-10 ENCOUNTER — OFFICE VISIT (OUTPATIENT)
Dept: SURGERY | Facility: CLINIC | Age: 77
End: 2017-02-10

## 2017-02-10 ENCOUNTER — APPOINTMENT (OUTPATIENT)
Dept: CT IMAGING | Facility: HOSPITAL | Age: 77
DRG: 187 | End: 2017-02-10
Attending: EMERGENCY MEDICINE
Payer: MEDICARE

## 2017-02-10 ENCOUNTER — HOSPITAL ENCOUNTER (INPATIENT)
Facility: HOSPITAL | Age: 77
LOS: 2 days | Discharge: HOME HEALTH | DRG: 187 | End: 2017-02-12
Attending: EMERGENCY MEDICINE | Admitting: INTERNAL MEDICINE
Payer: MEDICARE

## 2017-02-10 ENCOUNTER — HOSPITAL ENCOUNTER (OUTPATIENT)
Dept: GENERAL RADIOLOGY | Facility: HOSPITAL | Age: 77
Discharge: HOME | DRG: 187 | End: 2017-02-10
Attending: INTERNAL MEDICINE
Payer: MEDICARE

## 2017-02-10 ENCOUNTER — APPOINTMENT (OUTPATIENT)
Dept: GENERAL RADIOLOGY | Facility: HOSPITAL | Age: 77
DRG: 187 | End: 2017-02-10
Attending: RADIOLOGY
Payer: MEDICARE

## 2017-02-10 ENCOUNTER — TELEPHONE (OUTPATIENT)
Dept: SURGERY | Facility: CLINIC | Age: 77
End: 2017-02-10

## 2017-02-10 VITALS
DIASTOLIC BLOOD PRESSURE: 77 MMHG | HEART RATE: 66 BPM | OXYGEN SATURATION: 96 % | TEMPERATURE: 97 F | WEIGHT: 143 LBS | BODY MASS INDEX: 24 KG/M2 | SYSTOLIC BLOOD PRESSURE: 157 MMHG

## 2017-02-10 DIAGNOSIS — C45.1 PERITONEAL MESOTHELIOMA (HCC): Primary | ICD-10-CM

## 2017-02-10 DIAGNOSIS — J94.8 HYDROPNEUMOTHORAX: Primary | ICD-10-CM

## 2017-02-10 DIAGNOSIS — J93.9 PNEUMOTHORAX ON RIGHT: ICD-10-CM

## 2017-02-10 PROCEDURE — 99152 MOD SED SAME PHYS/QHP 5/>YRS: CPT

## 2017-02-10 PROCEDURE — 32551 INSERTION OF CHEST TUBE: CPT

## 2017-02-10 PROCEDURE — 99285 EMERGENCY DEPT VISIT HI MDM: CPT

## 2017-02-10 PROCEDURE — 71020 XR CHEST PA + LAT CHEST (CPT=71020): CPT

## 2017-02-10 PROCEDURE — 99024 POSTOP FOLLOW-UP VISIT: CPT | Performed by: NURSE PRACTITIONER

## 2017-02-10 PROCEDURE — 99153 MOD SED SAME PHYS/QHP EA: CPT

## 2017-02-10 PROCEDURE — 0W9930Z DRAINAGE OF RIGHT PLEURAL CAVITY WITH DRAINAGE DEVICE, PERCUTANEOUS APPROACH: ICD-10-PCS | Performed by: RADIOLOGY

## 2017-02-10 PROCEDURE — 71010 XR CHEST AP PORTABLE  (CPT=71010): CPT

## 2017-02-10 RX ORDER — POTASSIUM CHLORIDE 20 MEQ/1
20 TABLET, EXTENDED RELEASE ORAL 2 TIMES DAILY
Status: DISCONTINUED | OUTPATIENT
Start: 2017-02-10 | End: 2017-02-12

## 2017-02-10 RX ORDER — ONDANSETRON 2 MG/ML
4 INJECTION INTRAMUSCULAR; INTRAVENOUS EVERY 4 HOURS PRN
Status: DISCONTINUED | OUTPATIENT
Start: 2017-02-10 | End: 2017-02-12

## 2017-02-10 RX ORDER — MIDAZOLAM HYDROCHLORIDE 1 MG/ML
INJECTION INTRAMUSCULAR; INTRAVENOUS
Status: COMPLETED
Start: 2017-02-10 | End: 2017-02-10

## 2017-02-10 RX ORDER — HYDROCODONE BITARTRATE AND ACETAMINOPHEN 5; 325 MG/1; MG/1
1-2 TABLET ORAL EVERY 4 HOURS PRN
Refills: 0 | COMMUNITY
Start: 2017-02-07

## 2017-02-10 RX ORDER — ATORVASTATIN CALCIUM 20 MG/1
20 TABLET, FILM COATED ORAL NIGHTLY
Status: DISCONTINUED | OUTPATIENT
Start: 2017-02-10 | End: 2017-02-12

## 2017-02-10 RX ORDER — HYDROMORPHONE HYDROCHLORIDE 1 MG/ML
0.5 INJECTION, SOLUTION INTRAMUSCULAR; INTRAVENOUS; SUBCUTANEOUS EVERY 30 MIN PRN
Status: ACTIVE | OUTPATIENT
Start: 2017-02-10 | End: 2017-02-10

## 2017-02-10 RX ORDER — FLUMAZENIL 0.1 MG/ML
0.2 INJECTION, SOLUTION INTRAVENOUS AS NEEDED
Status: DISCONTINUED | OUTPATIENT
Start: 2017-02-10 | End: 2017-02-12

## 2017-02-10 RX ORDER — LISINOPRIL 2.5 MG/1
2.5 TABLET ORAL DAILY
Status: DISCONTINUED | OUTPATIENT
Start: 2017-02-11 | End: 2017-02-12

## 2017-02-10 RX ORDER — MIDAZOLAM HYDROCHLORIDE 1 MG/ML
1 INJECTION INTRAMUSCULAR; INTRAVENOUS EVERY 5 MIN PRN
Status: ACTIVE | OUTPATIENT
Start: 2017-02-10 | End: 2017-02-10

## 2017-02-10 RX ORDER — SODIUM CHLORIDE 9 MG/ML
INJECTION, SOLUTION INTRAVENOUS CONTINUOUS
Status: DISCONTINUED | OUTPATIENT
Start: 2017-02-10 | End: 2017-02-12

## 2017-02-10 RX ORDER — HYDROCODONE BITARTRATE AND ACETAMINOPHEN 5; 325 MG/1; MG/1
1-2 TABLET ORAL EVERY 4 HOURS PRN
Status: DISCONTINUED | OUTPATIENT
Start: 2017-02-10 | End: 2017-02-12

## 2017-02-10 RX ORDER — NALOXONE HYDROCHLORIDE 0.4 MG/ML
80 INJECTION, SOLUTION INTRAMUSCULAR; INTRAVENOUS; SUBCUTANEOUS AS NEEDED
Status: DISCONTINUED | OUTPATIENT
Start: 2017-02-10 | End: 2017-02-12

## 2017-02-10 RX ORDER — LEVOTHYROXINE SODIUM 0.12 MG/1
125 TABLET ORAL
Status: DISCONTINUED | OUTPATIENT
Start: 2017-02-11 | End: 2017-02-12

## 2017-02-10 RX ORDER — FOLIC ACID 1 MG/1
1 TABLET ORAL DAILY
Status: DISCONTINUED | OUTPATIENT
Start: 2017-02-11 | End: 2017-02-12

## 2017-02-10 RX ORDER — CARVEDILOL 3.12 MG/1
3.12 TABLET ORAL 2 TIMES DAILY WITH MEALS
Status: DISCONTINUED | OUTPATIENT
Start: 2017-02-10 | End: 2017-02-12

## 2017-02-10 NOTE — PROCEDURES
[facility]  Pre-Procedure Note    Name: Florence Yañez  MRN#: [id number]  : 1940    Procedure:  Right chest tube placement    Indication:  Symptomatic recurrent right pneumothorax.       Allergies:    No Known Allergies    Pertinent Medications

## 2017-02-10 NOTE — PROCEDURES
[facility] Patient Status:  Emergency    1940 MRN [id number]   Location [address] [facility] Attending Nico Martini MD   Hosp Day # 0 PCP Patricia Sotelo MD         Procedure Report    Pre-Operative Diagnosi

## 2017-02-10 NOTE — ED PROVIDER NOTES
Patient Seen in: [facility] Emergency Department    History   Patient presents with:  Abnormal Result (metabolic, cardiac)    Stated Complaint: sent by pmd for hydropneumothorax    HPI    53-year-old male sent to emergency room by his surgeon Dr. Donnal Claude Comment: was cigars only    Alcohol Use: Yes                Comment: 1-2 per month/social      Review of Systems    Positive for stated complaint: sent by pmd for hydropneumothorax  Other systems are as noted in HPI.   Constitutional and vital sign 2/05/2017, 12:39.  TECHNIQUE:  PA and lateral chest radiographs were obtained. PATIENT STATED HISTORY:  Right pneumothorax. Recent lung surgery.     FINDINGS:  Large right pneumothorax has significantly increased in size since the previous exam. Along the

## 2017-02-10 NOTE — PROGRESS NOTES
NURSING ADMISSION NOTE      Patient admitted via Cart  Oriented to room. Safety precautions initiated. Bed in low position. Call light in reach. Admission navigator completed. Report given to Shade Rob RN.

## 2017-02-10 NOTE — ED INITIAL ASSESSMENT (HPI)
Patient reports he had recent surgery for his cancer and a collapsed lung. Reports he was scheduled for an outpatient x-ray for follow up. Pneumothorax has increased since discharge. Patient denies any complaints, no chest pain, no SOB.

## 2017-02-10 NOTE — PROGRESS NOTES
Patient presents with:  Post-Op: Cytoreductive surgery to include peritonectomy, partial resection of bilateral diaphragms with simple repair on the right and complex repair on the left, cholecystectomy, appendectomy, left ureterolysis, partial resection o

## 2017-02-10 NOTE — TELEPHONE ENCOUNTER
Received call from radiology that patient had chest xray completed which reveals a large hydropneumothorax. They are attempting to contact patient to have him go to ED. CXR was ordered as follow up by pulmonary service.   Contacted Dr. Matt Never to Lam Grover

## 2017-02-10 NOTE — PROGRESS NOTES
[facility] Surgical Oncology    Patient Name:  Yosi Georges   YOB: 1940   Gender:  Male   Appt Date:  2/10/2017   Provider:  ALEXANDRA Kirk   Insurance:  MEDICARE PART B ONLY     PATIENT PROVIDERS  Primary Care [address], and complex repair on the left, cholecystectomy, appendenctomy, partial resecion of abdominal wall and omentectomy,  HIPEC with cisplatin.     PCI - 20  CC - 1    He presents today for scheduled post operative follow up     Vital Signs:  /77 mmHg  Pul feeling good. Appetite has returned over the past couple of days. He is having regular bowel movements, formed, no diarrhea. Denies fevers/chills, chest pain or shortness of breath. He has been active at home post discharge.    States he is walking arou pelvic peritoneum:   -Malignant mesothelioma. K- Portion of right diaphragm, excision:   -Malignant mesothelioma.      L- Gallbladder, cholecystectomy:   -Malignant mesothelioma involves fibroadipose tissue.    -Gallbladder with chronic cholecystitis.

## 2017-02-10 NOTE — IMAGING NOTE
Pt over to CT from ED for urgent chest tube placement. After informed consent per Dr Mariah Neely, consent was obtained and pt positioned supine. R power port accessed and IV fluids hung for procedural sedation.  Pt tolerated the procedure well and received a 8

## 2017-02-11 ENCOUNTER — APPOINTMENT (OUTPATIENT)
Dept: GENERAL RADIOLOGY | Facility: HOSPITAL | Age: 77
DRG: 187 | End: 2017-02-11
Attending: SURGERY
Payer: MEDICARE

## 2017-02-11 LAB
ATRIAL RATE: 61 BPM
BASOPHILS # BLD AUTO: 0.02 X10(3) UL (ref 0–0.1)
BASOPHILS NFR BLD AUTO: 0.4 %
BUN BLD-MCNC: 7 MG/DL (ref 8–20)
CALCIUM BLD-MCNC: 7.7 MG/DL (ref 8.3–10.3)
CHLORIDE: 105 MMOL/L (ref 101–111)
CK: 34 IU/L (ref 39–308)
CO2: 26 MMOL/L (ref 22–32)
CREAT BLD-MCNC: 0.8 MG/DL (ref 0.7–1.3)
EOSINOPHIL # BLD AUTO: 0.16 X10(3) UL (ref 0–0.3)
EOSINOPHIL NFR BLD AUTO: 3.3 %
ERYTHROCYTE [DISTWIDTH] IN BLOOD BY AUTOMATED COUNT: 17.4 % (ref 11.5–16)
GLUCOSE BLD-MCNC: 86 MG/DL (ref 70–99)
HCT VFR BLD AUTO: 29.5 % (ref 37–53)
HGB BLD-MCNC: 9.6 G/DL (ref 13–17)
IMMATURE GRANULOCYTE COUNT: 0.02 X10(3) UL (ref 0–1)
IMMATURE GRANULOCYTE RATIO %: 0.4 %
LYMPHOCYTES # BLD AUTO: 1.11 X10(3) UL (ref 0.9–4)
LYMPHOCYTES NFR BLD AUTO: 22.8 %
MCH RBC QN AUTO: 32.3 PG (ref 27–33.2)
MCHC RBC AUTO-ENTMCNC: 32.5 G/DL (ref 31–37)
MCV RBC AUTO: 99.3 FL (ref 80–99)
MONOCYTES # BLD AUTO: 0.44 X10(3) UL (ref 0.1–0.6)
MONOCYTES NFR BLD AUTO: 9.1 %
NEUTROPHIL ABS PRELIM: 3.11 X10 (3) UL (ref 1.3–6.7)
NEUTROPHILS # BLD AUTO: 3.11 X10(3) UL (ref 1.3–6.7)
NEUTROPHILS NFR BLD AUTO: 64 %
P AXIS: 41 DEGREES
P-R INTERVAL: 140 MS
PLATELET # BLD AUTO: 264 10(3)UL (ref 150–450)
POTASSIUM SERPL-SCNC: 4.3 MMOL/L (ref 3.6–5.1)
Q-T INTERVAL: 416 MS
QRS DURATION: 108 MS
QTC CALCULATION (BEZET): 418 MS
R AXIS: 42 DEGREES
RBC # BLD AUTO: 2.97 X10(6)UL (ref 3.8–5.8)
RED CELL DISTRIBUTION WIDTH-SD: 63.9 FL (ref 35.1–46.3)
SODIUM SERPL-SCNC: 137 MMOL/L (ref 136–144)
T AXIS: 0 DEGREES
TROPONIN: <0.046 NG/ML (ref ?–0.05)
VENTRICULAR RATE: 61 BPM
WBC # BLD AUTO: 4.9 X10(3) UL (ref 4–13)

## 2017-02-11 PROCEDURE — 71010 XR CHEST AP PORTABLE  (CPT=71010): CPT

## 2017-02-11 PROCEDURE — 84484 ASSAY OF TROPONIN QUANT: CPT | Performed by: INTERNAL MEDICINE

## 2017-02-11 PROCEDURE — 93005 ELECTROCARDIOGRAM TRACING: CPT

## 2017-02-11 PROCEDURE — 85025 COMPLETE CBC W/AUTO DIFF WBC: CPT | Performed by: INTERNAL MEDICINE

## 2017-02-11 PROCEDURE — 82550 ASSAY OF CK (CPK): CPT | Performed by: INTERNAL MEDICINE

## 2017-02-11 PROCEDURE — 93010 ELECTROCARDIOGRAM REPORT: CPT | Performed by: INTERNAL MEDICINE

## 2017-02-11 PROCEDURE — 80048 BASIC METABOLIC PNL TOTAL CA: CPT | Performed by: INTERNAL MEDICINE

## 2017-02-11 NOTE — CONSULTS
[facility] Surgical Oncology    Patient Name:  Maria Guadalupe Barrios   YOB: 1940   Gender:  Male   Appt Date:  2/10/2017   Provider:  No name on file.    Insurance:  MEDICARE PART A&B     Ilda Galvez MD • Hypothyroidism    • Hyperlipidemia    • Essential hypertension    • Peritoneal mesothelioma [facility])    • Disorder of thyroid    • Personal history of antineoplastic chemotherapy 12/20/16   • High blood pressure    • High cholesterol    • Cancer (HCC) Skin: Inspection and palpation: no jaundice. Document Review:  Imaging reviewed.      Procedure(s):  None     Assessment / Plan:  Peritoneal mesothelioma    S/P CRS/HIPEC    Right hydropneumothorax    S/P chest tube placement    Follow CXR          Connie

## 2017-02-11 NOTE — PLAN OF CARE
Patient alert and oriented times four. Meds given per MAR. Chest tube to suction. Vital signs WNL. Patient denies any pain or shortness of breath. Resting comfortably in bed.      Patient/Family Goals    • Patient/Family Long Term Goal Progressing    •

## 2017-02-11 NOTE — PLAN OF CARE
Dr. Mariusz Darden aware of admission. Orders received to restart PTA med list. Bruno paged regarding admission. Awaiting call back.

## 2017-02-11 NOTE — H&P
Cardiology Consultation Note- HENRIETTA Ojeda MD    The patient was interviewed, examined, the chart was reviewed and the consult was dictated. This is a 64year old male with admitted with increasing pneumothorax . Impression:  1. Arrhythmia ? vatach  2.  Lei Asa

## 2017-02-11 NOTE — PLAN OF CARE
Assumed care at 0730. A/Ox4. NI. NSR. Pt had approximately 15 beats of vtach, cards consulted. CXR completed, showed improvement. Chest tube put to water seal.   Repeat CXR showed resolution of pneumo. Per Dr. De Six- chest tube clamped.   Repeat CXR in AM

## 2017-02-11 NOTE — H&P
[address] History & Physical    Hieu Mattson Patient Status:  Inpatient    1940 MRN [id number]   [facility] 7NE-A Attending Aimee Schwab MD   Hosp Day # 1 PCP Darnell Thomas MD     History of Present Hafsa Olguin prior to admission:  HYDROcodone-acetaminophen 5-325 MG Oral Tab Take 1-2 tablets by mouth every 4 (four) hours as needed for Pain. Disp:  Rfl: 0    Potassium Chloride ER 20 MEQ Oral Tab CR Take 1 tablet (20 mEq total) by mouth 2 (two) times daily.  Disp: active all four quadrants,     no masses, no organomegaly   Genitalia:     Rectal:     Extremities:   Extremities normal, atraumatic, no cyanosis or edema   Pulses:   2+ and symmetric all extremities   Skin:   Skin color, texture, turgor normal, no rashes

## 2017-02-12 ENCOUNTER — APPOINTMENT (OUTPATIENT)
Dept: GENERAL RADIOLOGY | Facility: HOSPITAL | Age: 77
DRG: 187 | End: 2017-02-12
Attending: RADIOLOGY
Payer: MEDICARE

## 2017-02-12 ENCOUNTER — APPOINTMENT (OUTPATIENT)
Dept: GENERAL RADIOLOGY | Facility: HOSPITAL | Age: 77
DRG: 187 | End: 2017-02-12
Attending: SURGERY
Payer: MEDICARE

## 2017-02-12 VITALS
RESPIRATION RATE: 18 BRPM | HEIGHT: 65 IN | DIASTOLIC BLOOD PRESSURE: 70 MMHG | BODY MASS INDEX: 23.31 KG/M2 | SYSTOLIC BLOOD PRESSURE: 134 MMHG | HEART RATE: 65 BPM | TEMPERATURE: 98 F | WEIGHT: 139.88 LBS | OXYGEN SATURATION: 97 %

## 2017-02-12 LAB
BASOPHILS # BLD AUTO: 0.01 X10(3) UL (ref 0–0.1)
BASOPHILS NFR BLD AUTO: 0.2 %
BUN BLD-MCNC: 8 MG/DL (ref 8–20)
CALCIUM BLD-MCNC: 7.8 MG/DL (ref 8.3–10.3)
CHLORIDE: 104 MMOL/L (ref 101–111)
CO2: 27 MMOL/L (ref 22–32)
CREAT BLD-MCNC: 0.79 MG/DL (ref 0.7–1.3)
EOSINOPHIL # BLD AUTO: 0.31 X10(3) UL (ref 0–0.3)
EOSINOPHIL NFR BLD AUTO: 5.6 %
ERYTHROCYTE [DISTWIDTH] IN BLOOD BY AUTOMATED COUNT: 17.7 % (ref 11.5–16)
GLUCOSE BLD-MCNC: 82 MG/DL (ref 70–99)
HCT VFR BLD AUTO: 30.7 % (ref 37–53)
HGB BLD-MCNC: 9.8 G/DL (ref 13–17)
IMMATURE GRANULOCYTE COUNT: 0.03 X10(3) UL (ref 0–1)
IMMATURE GRANULOCYTE RATIO %: 0.5 %
LYMPHOCYTES # BLD AUTO: 1.09 X10(3) UL (ref 0.9–4)
LYMPHOCYTES NFR BLD AUTO: 19.6 %
MCH RBC QN AUTO: 31.9 PG (ref 27–33.2)
MCHC RBC AUTO-ENTMCNC: 31.9 G/DL (ref 31–37)
MCV RBC AUTO: 100 FL (ref 80–99)
MONOCYTES # BLD AUTO: 0.44 X10(3) UL (ref 0.1–0.6)
MONOCYTES NFR BLD AUTO: 7.9 %
NEUTROPHIL ABS PRELIM: 3.69 X10 (3) UL (ref 1.3–6.7)
NEUTROPHILS # BLD AUTO: 3.69 X10(3) UL (ref 1.3–6.7)
NEUTROPHILS NFR BLD AUTO: 66.2 %
PLATELET # BLD AUTO: 293 10(3)UL (ref 150–450)
POTASSIUM SERPL-SCNC: 4.2 MMOL/L (ref 3.6–5.1)
RBC # BLD AUTO: 3.07 X10(6)UL (ref 3.8–5.8)
RED CELL DISTRIBUTION WIDTH-SD: 64.5 FL (ref 35.1–46.3)
SODIUM SERPL-SCNC: 136 MMOL/L (ref 136–144)
WBC # BLD AUTO: 5.6 X10(3) UL (ref 4–13)

## 2017-02-12 PROCEDURE — 71010 XR CHEST AP PORTABLE  (CPT=71010): CPT

## 2017-02-12 PROCEDURE — 80048 BASIC METABOLIC PNL TOTAL CA: CPT | Performed by: FAMILY MEDICINE

## 2017-02-12 PROCEDURE — 85025 COMPLETE CBC W/AUTO DIFF WBC: CPT | Performed by: FAMILY MEDICINE

## 2017-02-12 NOTE — PROGRESS NOTES
Name:Ambrose Wilks  Religious#:[id number]  :1940      Subjective:  No complaint. Wants to go home    Objective:  Chest tube in place. Vital Signs:  Blood pressure 134/70, pulse 65, temperature 98 °F (36.7 °C), temperature source Oral, resp.  rate 18,

## 2017-02-12 NOTE — CONSULTS
[facility]    PATIENT'S NAME: Amalia Weiner   ATTENDING PHYSICIAN: Angel Grossman M.D.   CONSULTING PHYSICIAN: Steven Matos M.D.    PATIENT ACCOUNT#:   [de-identified]    LOCATION:  [address] [place] [address]  MEDICAL RECORD #:   [id number]       DATE OF BIRTH:  [birth year]/ wide QRS tachycardia short run and inoperable coronary artery disease. RESPIRATORY:  He has a chest tube for the pneumothorax. GASTROINTESTINAL:  He has mesothelioma.           PHYSICAL EXAMINATION:    VITAL SIGNS:  Blood pressure is 130/70, heart rate is

## 2017-02-12 NOTE — PROGRESS NOTES
[address] Discharge NOTE    Christal Retana Patient Status:  Inpatient    1940 MRN [id number]   [facility] 7NE-A Attending Gerhard Yancey MD   Hosp Day # 2 PCP Gabriel Sanchez MD     History of Present Illness: drinks alcohol. He reports that he does not use illicit drugs. Allergies:  No Known Allergies    Home Medications:    No prescriptions prior to admission    Review of Systems:  Pertinent items are noted in HPI.     Physical Exam:  /70 mmHg  Pulse 6 02/12/2017    02/12/2017   CO2 27.0 02/12/2017   GLU 82 02/12/2017   CA 7.8 02/12/2017       Imaging:  X-Ray    Assessment:  Patient Active Problem List:     Hypertension     Hypothyroidism     Hyperlipidemia     Peritoneal mesothelioma ([facility] [address]     Hyd

## 2017-02-12 NOTE — PLAN OF CARE
Assumed care of patient at 1900. Alert and oriented x3. No complaints of pain. VSS. Right small bore chest tube clamped. Lungs sounds clear/diminished. On Room air. SR/SB with heart rate 50-60's on tele.  Mid abdominal incision with staples and well approxi

## 2017-02-12 NOTE — PROGRESS NOTES
Patient feels well    No pain  No SOB/cough/chest pain    Blood pressure 134/70, pulse 65, temperature 98 °F (36.7 °C), temperature source Oral, resp. rate 18, height 1.651 m (5' 5\"), weight 63.458 kg (139 lb 14.4 oz), SpO2 97 %.   I/O last 3 completed ginny

## 2017-02-12 NOTE — DIETARY NOTE
[facility]. History & Physical    Tahir Suggs Patient Status:  Inpatient    1940 MRN [id number]   [facility] 7NE-A Attending Eduardo Chavarria MD   Hosp Day # 2 PCP Tomas Cueva MD     History of Present Carmelina Simms drinks alcohol. He reports that he does not use illicit drugs.     Allergies:  No Known Allergies    Home Medications:    Prescriptions prior to admission:  HYDROcodone-acetaminophen 5-325 MG Oral Tab Take 1-2 tablets by mouth every 4 (four) hours as needed Regular rate and rhythm, S1 and S2 normal, no murmur, rub   or gallop   Abdomen:     Soft, non-tender, bowel sounds active all four quadrants,     no masses, no organomegaly   Genitalia:     Rectal:     Extremities:   Extremities normal, atraumatic, no cya

## 2017-02-12 NOTE — PLAN OF CARE
Discharge paperwork and instructions given to patient and dtr, both verbalized understanding. Heparin placed in port and iv removed without problems. Chest tube removed by ir md today without problems.  Cardiologist stated ok for patient to d/c home via raad

## 2017-02-12 NOTE — CM/SW NOTE
bladimir notified that pt is current with [facility] and will dc home today.  Pt is current with [facility]. [facility] notified and dc orders sent    [address]

## 2017-02-12 NOTE — PROGRESS NOTES
Name:Ambrose Larkin  PNX#:[id number]  :1940      Subjective:  Feels better. Objective:  Right chest tube site is clean, dry and intact.      Vital Signs:  Blood pressure 131/69, pulse 69, temperature 98.1 °F (36.7 °C), temperature source Oral, re

## 2017-02-15 ENCOUNTER — HOSPITAL ENCOUNTER (OUTPATIENT)
Dept: GENERAL RADIOLOGY | Facility: HOSPITAL | Age: 77
Discharge: HOME | End: 2017-02-15
Attending: SURGERY
Payer: MEDICARE

## 2017-02-15 ENCOUNTER — OFFICE VISIT (OUTPATIENT)
Dept: SURGERY | Facility: CLINIC | Age: 77
End: 2017-02-15

## 2017-02-15 VITALS
TEMPERATURE: 98 F | DIASTOLIC BLOOD PRESSURE: 68 MMHG | BODY MASS INDEX: 22.52 KG/M2 | WEIGHT: 135.19 LBS | HEART RATE: 72 BPM | OXYGEN SATURATION: 98 % | HEIGHT: 65 IN | SYSTOLIC BLOOD PRESSURE: 110 MMHG

## 2017-02-15 DIAGNOSIS — J94.8 HYDROPNEUMOTHORAX: ICD-10-CM

## 2017-02-15 DIAGNOSIS — C45.1 PERITONEAL MESOTHELIOMA (HCC): Primary | ICD-10-CM

## 2017-02-15 PROCEDURE — 99024 POSTOP FOLLOW-UP VISIT: CPT | Performed by: SURGERY

## 2017-02-15 PROCEDURE — 71020 XR CHEST PA + LAT CHEST (CPT=71020): CPT

## 2017-02-17 NOTE — PROGRESS NOTES
[facility] Surgical Oncology    Patient Name:  Yolanda Aguayo   YOB: 1940   Gender:  Male   Appt Date:  2/15/2017   Provider:  Arti Hassan MD   Insurance:  Brittaney Artis MD 1/31/17 - Cytoreductive surgery including peritonectomy, partial resection of bilateral diaphragms with simple repair on the right and complex repair on the left, cholecystectomy, appendenctomy, partial resecion of abdominal wall and omentectomy,  HIPEC wi Smoking Status: Former Smoker                   Packs/Day: 1.00  Years: 13        Quit date: 05/01/2016    Smokeless Status: Never Used                        Comment: was cigars only    Alcohol Use: Yes                Comment: 1-2 per month/social     R

## 2017-02-24 ENCOUNTER — PRIOR ORIGINAL RECORDS (OUTPATIENT)
Dept: OTHER | Age: 77
End: 2017-02-24

## 2017-04-28 ENCOUNTER — PRIOR ORIGINAL RECORDS (OUTPATIENT)
Dept: OTHER | Age: 77
End: 2017-04-28

## 2017-05-23 ENCOUNTER — PRIOR ORIGINAL RECORDS (OUTPATIENT)
Dept: OTHER | Age: 77
End: 2017-05-23

## 2017-06-07 ENCOUNTER — RESEARCH ENCOUNTER (OUTPATIENT)
Dept: HEMATOLOGY/ONCOLOGY | Facility: HOSPITAL | Age: 77
End: 2017-06-07

## 2017-06-07 NOTE — PROGRESS NOTES
STUDY: [facility] DATABASE REGISTRY  ID # [id number] 6 month follow up     Patient returned my call yesterday. States he feels great and he is able to golf! States he gained 20lbs in last few months. He is due for CTscan in August of this year.   Patient is follow

## 2017-08-09 ENCOUNTER — HOSPITAL (OUTPATIENT)
Dept: OTHER | Age: 77
End: 2017-08-09
Attending: INTERNAL MEDICINE

## 2017-08-09 LAB
BUN SERPL-MCNC: 16 MG/DL (ref 6–20)
BUN/CREAT SERPL: 16 (ref 7–25)
CREAT SERPL-MCNC: 1.03 MG/DL (ref 0.67–1.17)

## 2017-08-17 ENCOUNTER — HOSPITAL (OUTPATIENT)
Dept: OTHER | Age: 77
End: 2017-08-17
Attending: INTERNAL MEDICINE

## 2017-08-22 ENCOUNTER — PRIOR ORIGINAL RECORDS (OUTPATIENT)
Dept: OTHER | Age: 77
End: 2017-08-22

## 2017-08-24 ENCOUNTER — HOSPITAL (OUTPATIENT)
Dept: OTHER | Age: 77
End: 2017-08-24
Attending: INTERNAL MEDICINE

## 2017-09-01 ENCOUNTER — HOSPITAL (OUTPATIENT)
Dept: OTHER | Age: 77
End: 2017-09-01
Attending: INTERNAL MEDICINE

## 2017-09-01 LAB
ANALYZER ANC (IANC): ABNORMAL
ERYTHROCYTE [DISTWIDTH] IN BLOOD: 12.4 % (ref 11–15)
HEMATOCRIT: 32 % (ref 39–51)
HGB BLD-MCNC: 10.6 GM/DL (ref 13–17)
INR PPP: 1
MCH RBC QN AUTO: 31.6 PG (ref 26–34)
MCHC RBC AUTO-ENTMCNC: 33.1 GM/DL (ref 32–36.5)
MCV RBC AUTO: 95.5 FL (ref 78–100)
PLATELET # BLD: 222 THOUSAND/MCL (ref 140–450)
PROTHROMBIN TIME: 10.9 SECONDS (ref 9.7–11.8)
PROTHROMBIN TIME: NORMAL
RBC # BLD: 3.35 MILLION/MCL (ref 4.5–5.9)
WBC # BLD: 4.6 THOUSAND/MCL (ref 4.2–11)

## 2017-09-12 ENCOUNTER — PRIOR ORIGINAL RECORDS (OUTPATIENT)
Dept: OTHER | Age: 77
End: 2017-09-12

## 2017-09-19 ENCOUNTER — PRIOR ORIGINAL RECORDS (OUTPATIENT)
Dept: OTHER | Age: 77
End: 2017-09-19

## 2017-09-26 ENCOUNTER — PRIOR ORIGINAL RECORDS (OUTPATIENT)
Dept: OTHER | Age: 77
End: 2017-09-26

## 2017-10-10 ENCOUNTER — PRIOR ORIGINAL RECORDS (OUTPATIENT)
Dept: OTHER | Age: 77
End: 2017-10-10

## 2017-10-17 ENCOUNTER — PRIOR ORIGINAL RECORDS (OUTPATIENT)
Dept: OTHER | Age: 77
End: 2017-10-17

## 2017-10-24 ENCOUNTER — PRIOR ORIGINAL RECORDS (OUTPATIENT)
Dept: OTHER | Age: 77
End: 2017-10-24

## 2017-11-03 ENCOUNTER — HOSPITAL (OUTPATIENT)
Dept: OTHER | Age: 77
End: 2017-11-03
Attending: INTERNAL MEDICINE

## 2017-11-07 ENCOUNTER — PRIOR ORIGINAL RECORDS (OUTPATIENT)
Dept: OTHER | Age: 77
End: 2017-11-07

## 2017-11-15 ENCOUNTER — PRIOR ORIGINAL RECORDS (OUTPATIENT)
Dept: OTHER | Age: 77
End: 2017-11-15

## 2017-11-21 ENCOUNTER — PRIOR ORIGINAL RECORDS (OUTPATIENT)
Dept: OTHER | Age: 77
End: 2017-11-21

## 2017-11-28 ENCOUNTER — PRIOR ORIGINAL RECORDS (OUTPATIENT)
Dept: OTHER | Age: 77
End: 2017-11-28

## 2017-12-05 ENCOUNTER — PRIOR ORIGINAL RECORDS (OUTPATIENT)
Dept: OTHER | Age: 77
End: 2017-12-05

## 2017-12-12 ENCOUNTER — PRIOR ORIGINAL RECORDS (OUTPATIENT)
Dept: OTHER | Age: 77
End: 2017-12-12

## 2017-12-19 ENCOUNTER — PRIOR ORIGINAL RECORDS (OUTPATIENT)
Dept: OTHER | Age: 77
End: 2017-12-19

## 2017-12-26 ENCOUNTER — PRIOR ORIGINAL RECORDS (OUTPATIENT)
Dept: OTHER | Age: 77
End: 2017-12-26

## 2017-12-31 ENCOUNTER — PRIOR ORIGINAL RECORDS (OUTPATIENT)
Dept: OTHER | Age: 77
End: 2017-12-31

## 2018-01-01 ENCOUNTER — HOSPITAL ENCOUNTER (EMERGENCY)
Facility: HOSPITAL | Age: 78
Discharge: HOME | End: 2018-01-01
Attending: EMERGENCY MEDICINE
Payer: MEDICARE

## 2018-01-01 VITALS
WEIGHT: 155 LBS | SYSTOLIC BLOOD PRESSURE: 148 MMHG | RESPIRATION RATE: 24 BRPM | DIASTOLIC BLOOD PRESSURE: 78 MMHG | OXYGEN SATURATION: 97 % | BODY MASS INDEX: 25.83 KG/M2 | TEMPERATURE: 97 F | HEIGHT: 65 IN | HEART RATE: 72 BPM

## 2018-01-01 DIAGNOSIS — E16.2 HYPOGLYCEMIA: Primary | ICD-10-CM

## 2018-01-01 PROCEDURE — 99282 EMERGENCY DEPT VISIT SF MDM: CPT | Performed by: EMERGENCY MEDICINE

## 2018-01-01 PROCEDURE — 82962 GLUCOSE BLOOD TEST: CPT

## 2018-01-02 ENCOUNTER — PRIOR ORIGINAL RECORDS (OUTPATIENT)
Dept: OTHER | Age: 78
End: 2018-01-02

## 2018-01-09 ENCOUNTER — PRIOR ORIGINAL RECORDS (OUTPATIENT)
Dept: OTHER | Age: 78
End: 2018-01-09

## 2018-01-13 ENCOUNTER — HOSPITAL (OUTPATIENT)
Dept: OTHER | Age: 78
End: 2018-01-13
Attending: INTERNAL MEDICINE

## 2018-01-16 ENCOUNTER — PRIOR ORIGINAL RECORDS (OUTPATIENT)
Dept: OTHER | Age: 78
End: 2018-01-16

## 2018-01-23 ENCOUNTER — PRIOR ORIGINAL RECORDS (OUTPATIENT)
Dept: OTHER | Age: 78
End: 2018-01-23

## 2018-02-13 ENCOUNTER — PRIOR ORIGINAL RECORDS (OUTPATIENT)
Dept: OTHER | Age: 78
End: 2018-02-13

## 2018-03-06 ENCOUNTER — PRIOR ORIGINAL RECORDS (OUTPATIENT)
Dept: OTHER | Age: 78
End: 2018-03-06

## 2018-03-24 ENCOUNTER — HOSPITAL (OUTPATIENT)
Dept: OTHER | Age: 78
End: 2018-03-24
Attending: INTERNAL MEDICINE

## 2018-03-27 ENCOUNTER — PRIOR ORIGINAL RECORDS (OUTPATIENT)
Dept: OTHER | Age: 78
End: 2018-03-27

## 2018-04-17 ENCOUNTER — PRIOR ORIGINAL RECORDS (OUTPATIENT)
Dept: OTHER | Age: 78
End: 2018-04-17

## 2018-05-11 ENCOUNTER — PRIOR ORIGINAL RECORDS (OUTPATIENT)
Dept: OTHER | Age: 78
End: 2018-05-11

## 2018-06-02 ENCOUNTER — HOSPITAL (OUTPATIENT)
Dept: OTHER | Age: 78
End: 2018-06-02
Attending: INTERNAL MEDICINE

## 2018-06-08 ENCOUNTER — PRIOR ORIGINAL RECORDS (OUTPATIENT)
Dept: OTHER | Age: 78
End: 2018-06-08

## 2018-07-20 ENCOUNTER — PRIOR ORIGINAL RECORDS (OUTPATIENT)
Dept: OTHER | Age: 78
End: 2018-07-20

## 2018-08-10 ENCOUNTER — PRIOR ORIGINAL RECORDS (OUTPATIENT)
Dept: OTHER | Age: 78
End: 2018-08-10

## 2018-08-31 ENCOUNTER — PRIOR ORIGINAL RECORDS (OUTPATIENT)
Dept: OTHER | Age: 78
End: 2018-08-31

## 2018-09-17 ENCOUNTER — HOSPITAL (OUTPATIENT)
Dept: OTHER | Age: 78
End: 2018-09-17
Attending: INTERNAL MEDICINE

## 2018-09-21 ENCOUNTER — PRIOR ORIGINAL RECORDS (OUTPATIENT)
Dept: OTHER | Age: 78
End: 2018-09-21

## 2018-10-12 ENCOUNTER — PRIOR ORIGINAL RECORDS (OUTPATIENT)
Dept: OTHER | Age: 78
End: 2018-10-12

## 2018-10-19 ENCOUNTER — PRIOR ORIGINAL RECORDS (OUTPATIENT)
Dept: OTHER | Age: 78
End: 2018-10-19

## 2018-11-09 ENCOUNTER — PRIOR ORIGINAL RECORDS (OUTPATIENT)
Dept: OTHER | Age: 78
End: 2018-11-09

## 2018-11-18 NOTE — ED INITIAL ASSESSMENT (HPI)
Arrives via [facility] for hypoglycemia. A caregiver came to the door this morning to care for wife and patient was found to have slurred speech, weakness, and walking with a limp. No hx of diabetes. Blood sugar was checked, noted to be 45.   Giv

## 2018-11-18 NOTE — ED PROVIDER NOTES
Patient Seen in: [facility] Emergency Department    History   Patient presents with:  Hypoglycemia (metabolic)    Stated Complaint: hypoglycemia    HPI    25-year-old male presents emergency department who apparently caregiver came this morning to c except as noted above.     Physical Exam     ED Triage Vitals [11/18/18 0934]   /76   Pulse 71   Resp 20   Temp 97.4 °F (36.3 °C)   Temp src Temporal   SpO2 97 %   O2 Device None (Room air)       Current:/76   Pulse 71   Temp 97.4 °F (36.3 °C) ( was made aware of medical condition and instructed to take medications as prescribed. Patient is aware that they are to return to ED if any worsening problems. Patient was also instructed to followup with the appropriate physician.   Patient verbalizes an

## 2018-11-19 ENCOUNTER — IMAGING SERVICES (OUTPATIENT)
Dept: OTHER | Age: 78
End: 2018-11-19

## 2018-11-19 ENCOUNTER — LAB SERVICES (OUTPATIENT)
Dept: OTHER | Age: 78
End: 2018-11-19

## 2018-11-19 ENCOUNTER — DIAGNOSTIC TRANS (OUTPATIENT)
Dept: OTHER | Age: 78
End: 2018-11-19

## 2018-11-19 LAB
ALBUMIN SERPL-MCNC: 2.1 GM/DL (ref 3.6–5.1)
ALBUMIN/GLOB SERPL: 0.4 {RATIO} (ref 1–2.4)
ALP SERPL-CCNC: 95 UNIT/L (ref 45–117)
ALT SERPL-CCNC: 10 UNIT/L
AMORPH SED URNS QL MICRO: ABNORMAL
ANALYZER ANC (IANC): ABNORMAL
ANION GAP SERPL CALC-SCNC: 13 MMOL/L (ref 10–20)
APPEARANCE UR: ABNORMAL
APTT PPP: 32 SECONDS (ref 22–32)
APTT PPP: NORMAL S
AST SERPL-CCNC: 19 UNIT/L
BACTERIA #/AREA URNS HPF: ABNORMAL /HPF
BASOPHILS # BLD: 0 THOUSAND/MCL (ref 0–0.3)
BASOPHILS NFR BLD: 0 %
BILIRUB SERPL-MCNC: 0.3 MG/DL (ref 0.2–1)
BILIRUB UR QL: NEGATIVE
BUN SERPL-MCNC: 10 MG/DL (ref 6–20)
BUN/CREAT SERPL: 14 (ref 7–25)
C-PEPTIDE: 0.8 NG/ML (ref 0.8–3.9)
CALCIUM SERPL-MCNC: 8.7 MG/DL (ref 8.4–10.2)
CAOX CRY URNS QL MICRO: ABNORMAL
CHLORIDE: 106 MMOL/L (ref 98–107)
CO2 SERPL-SCNC: 26 MMOL/L (ref 21–32)
COLOR UR: YELLOW
CORTIS P 1 MG DEX SERPL-MCNC: 11.7 MCG/DL (ref 3.4–22.5)
CREAT SERPL-MCNC: 0.7 MG/DL (ref 0.67–1.17)
DIFFERENTIAL METHOD BLD: ABNORMAL
EOSINOPHIL # BLD: 0.1 THOUSAND/MCL (ref 0.1–0.5)
EOSINOPHIL NFR BLD: 2 %
EPITH CASTS #/AREA URNS LPF: ABNORMAL /[LPF]
ERYTHROCYTE [DISTWIDTH] IN BLOOD: 16.9 % (ref 11–15)
FATTY CASTS #/AREA URNS LPF: ABNORMAL /[LPF]
GLOBULIN SER-MCNC: 4.8 GM/DL (ref 2–4)
GLUCOSE BLDC GLUCOMTR-MCNC: 105 MG/DL (ref 65–99)
GLUCOSE BLDC GLUCOMTR-MCNC: 107 MG/DL (ref 65–99)
GLUCOSE BLDC GLUCOMTR-MCNC: 131 MG/DL (ref 65–99)
GLUCOSE BLDC GLUCOMTR-MCNC: 405 MG/DL (ref 65–99)
GLUCOSE BLDC GLUCOMTR-MCNC: 57 MG/DL (ref 65–99)
GLUCOSE BLDC GLUCOMTR-MCNC: 57 MG/DL (ref 65–99)
GLUCOSE BLDC GLUCOMTR-MCNC: 68 MG/DL (ref 65–99)
GLUCOSE BLDC GLUCOMTR-MCNC: 77 MG/DL (ref 65–99)
GLUCOSE BLDC GLUCOMTR-MCNC: 88 MG/DL (ref 65–99)
GLUCOSE BLDC GLUCOMTR-MCNC: 89 MG/DL (ref 65–99)
GLUCOSE BLDC GLUCOMTR-MCNC: 89 MG/DL (ref 65–99)
GLUCOSE BLDC GLUCOMTR-MCNC: 93 MG/DL (ref 65–99)
GLUCOSE BLDC GLUCOMTR-MCNC: 99 MG/DL (ref 65–99)
GLUCOSE SERPL-MCNC: 119 MG/DL (ref 65–99)
GLUCOSE UR-MCNC: ABNORMAL MG/DL
GRAN CASTS #/AREA URNS LPF: ABNORMAL /[LPF]
HEMATOCRIT: 28.2 % (ref 39–51)
HGB BLD-MCNC: 8.3 GM/DL (ref 13–17)
HGB UR QL: NEGATIVE
HYALINE CASTS #/AREA URNS LPF: ABNORMAL /LPF (ref 0–5)
INR PPP: 1.1
INSULIN SERPL-ACNC: 1 MUNIT/L
KETONES UR-MCNC: NEGATIVE MG/DL
LACTATE BLDV-MCNC: 2.1 MMOL/L
LACTATE BLDV-MCNC: 2.3 MMOL/L
LACTATE BLDV-MCNC: 2.3 MMOL/L
LEUKOCYTE ESTERASE UR QL STRIP: NEGATIVE
LIPASE SERPL-CCNC: 222 UNIT/L (ref 73–393)
LYMPHOCYTES # BLD: 0.8 THOUSAND/MCL (ref 1–4)
LYMPHOCYTES NFR BLD: 14 %
MCH RBC QN AUTO: 26.3 PG (ref 26–34)
MCHC RBC AUTO-ENTMCNC: 29.4 GM/DL (ref 32–36.5)
MCV RBC AUTO: 89.5 FL (ref 78–100)
MIXED CELL CASTS #/AREA URNS LPF: ABNORMAL /[LPF]
MONOCYTES # BLD: 0.6 THOUSAND/MCL (ref 0.3–0.9)
MONOCYTES NFR BLD: 12 %
MUCOUS THREADS URNS QL MICRO: PRESENT
NEUTROPHILS # BLD: 3.9 THOUSAND/MCL (ref 1.8–7.7)
NEUTROPHILS NFR BLD: 72 %
NEUTS SEG NFR BLD: ABNORMAL %
NITRITE UR QL: NEGATIVE
NRBC (NRBCRE): ABNORMAL
PH UR: 7 UNIT (ref 5–7)
PLATELET # BLD: 312 THOUSAND/MCL (ref 140–450)
POTASSIUM SERPL-SCNC: 3.9 MMOL/L (ref 3.4–5.1)
PROCALCITONIN SERPL IA-MCNC: 0.2 NG/ML
PROT SERPL-MCNC: 6.9 GM/DL (ref 6.4–8.2)
PROT UR QL: 30 MG/DL
PROTHROMBIN TIME: 11.3 SECONDS (ref 9.7–11.8)
PROTHROMBIN TIME: NORMAL
RBC # BLD: 3.15 MILLION/MCL (ref 4.5–5.9)
RBC #/AREA URNS HPF: ABNORMAL /HPF (ref 0–3)
RBC CASTS #/AREA URNS LPF: ABNORMAL /[LPF]
RENAL EPI CELLS #/AREA URNS HPF: ABNORMAL /[HPF]
SODIUM SERPL-SCNC: 141 MMOL/L (ref 135–145)
SP GR UR: 1.02 (ref 1–1.03)
SPECIMEN SOURCE: ABNORMAL
SPERM URNS QL MICRO: ABNORMAL
SQUAMOUS #/AREA URNS HPF: ABNORMAL /HPF (ref 0–5)
T VAGINALIS URNS QL MICRO: ABNORMAL
TRI-PHOS CRY URNS QL MICRO: ABNORMAL
URATE CRY URNS QL MICRO: ABNORMAL
URINE REFLEX: ABNORMAL
URNS CMNT MICRO: ABNORMAL
UROBILINOGEN UR QL: 4 MG/DL (ref 0–1)
WAXY CASTS #/AREA URNS LPF: ABNORMAL /[LPF]
WBC # BLD: 5.4 THOUSAND/MCL (ref 4.2–11)
WBC #/AREA URNS HPF: ABNORMAL /HPF (ref 0–5)
WBC CASTS #/AREA URNS LPF: ABNORMAL /[LPF]
YEAST HYPHAE URNS QL MICRO: ABNORMAL
YEAST URNS QL MICRO: ABNORMAL

## 2018-11-20 ENCOUNTER — HOSPITAL (OUTPATIENT)
Dept: OTHER | Age: 78
End: 2018-11-20
Attending: INTERNAL MEDICINE

## 2018-11-20 LAB
ALBUMIN SERPL-MCNC: 1.8 GM/DL (ref 3.6–5.1)
ALBUMIN/GLOB SERPL: 0.4 {RATIO} (ref 1–2.4)
ALP SERPL-CCNC: 91 UNIT/L (ref 45–117)
ALT SERPL-CCNC: 10 UNIT/L
ANALYZER ANC (IANC): ABNORMAL
ANION GAP SERPL CALC-SCNC: 10 MMOL/L (ref 10–20)
ANION GAP SERPL CALC-SCNC: 14 MMOL/L (ref 10–20)
AST SERPL-CCNC: 18 UNIT/L
B-OH-BUTYR SERPL-SCNC: 0.1 MMOL/L (ref 0–0.3)
BASOPHILS # BLD: 0 THOUSAND/MCL (ref 0–0.3)
BASOPHILS NFR BLD: 0 %
BILIRUB SERPL-MCNC: 0.2 MG/DL (ref 0.2–1)
BUN SERPL-MCNC: 7 MG/DL (ref 6–20)
BUN SERPL-MCNC: 8 MG/DL (ref 6–20)
BUN/CREAT SERPL: 12 (ref 7–25)
BUN/CREAT SERPL: 13 (ref 7–25)
C-PEPTIDE: 0.2 NG/ML (ref 0.8–3.9)
CALCIUM SERPL-MCNC: 7.9 MG/DL (ref 8.4–10.2)
CALCIUM SERPL-MCNC: 7.9 MG/DL (ref 8.4–10.2)
CHLORIDE: 105 MMOL/L (ref 98–107)
CHLORIDE: 105 MMOL/L (ref 98–107)
CO2 SERPL-SCNC: 23 MMOL/L (ref 21–32)
CO2 SERPL-SCNC: 24 MMOL/L (ref 21–32)
CORTIS P 1 MG DEX SERPL-MCNC: 12.1 MCG/DL (ref 3.4–22.5)
CREAT SERPL-MCNC: 0.59 MG/DL (ref 0.67–1.17)
CREAT SERPL-MCNC: 0.62 MG/DL (ref 0.67–1.17)
DIFFERENTIAL METHOD BLD: ABNORMAL
EOSINOPHIL # BLD: 0.1 THOUSAND/MCL (ref 0.1–0.5)
EOSINOPHIL NFR BLD: 2 %
ERYTHROCYTE [DISTWIDTH] IN BLOOD: 16.8 % (ref 11–15)
GLOBULIN SER-MCNC: 4.5 GM/DL (ref 2–4)
GLUCOSE BLDC GLUCOMTR-MCNC: 111 MG/DL (ref 65–99)
GLUCOSE BLDC GLUCOMTR-MCNC: 112 MG/DL (ref 65–99)
GLUCOSE BLDC GLUCOMTR-MCNC: 114 MG/DL (ref 65–99)
GLUCOSE BLDC GLUCOMTR-MCNC: 115 MG/DL (ref 65–99)
GLUCOSE BLDC GLUCOMTR-MCNC: 47 MG/DL (ref 65–99)
GLUCOSE BLDC GLUCOMTR-MCNC: 53 MG/DL (ref 65–99)
GLUCOSE BLDC GLUCOMTR-MCNC: 54 MG/DL (ref 65–99)
GLUCOSE BLDC GLUCOMTR-MCNC: 55 MG/DL (ref 65–99)
GLUCOSE BLDC GLUCOMTR-MCNC: 59 MG/DL (ref 65–99)
GLUCOSE BLDC GLUCOMTR-MCNC: 60 MG/DL (ref 65–99)
GLUCOSE BLDC GLUCOMTR-MCNC: 61 MG/DL (ref 65–99)
GLUCOSE BLDC GLUCOMTR-MCNC: 63 MG/DL (ref 65–99)
GLUCOSE BLDC GLUCOMTR-MCNC: 64 MG/DL (ref 65–99)
GLUCOSE BLDC GLUCOMTR-MCNC: 65 MG/DL (ref 65–99)
GLUCOSE BLDC GLUCOMTR-MCNC: 67 MG/DL (ref 65–99)
GLUCOSE BLDC GLUCOMTR-MCNC: 68 MG/DL (ref 65–99)
GLUCOSE BLDC GLUCOMTR-MCNC: 69 MG/DL (ref 65–99)
GLUCOSE BLDC GLUCOMTR-MCNC: 76 MG/DL (ref 65–99)
GLUCOSE BLDC GLUCOMTR-MCNC: 77 MG/DL (ref 65–99)
GLUCOSE BLDC GLUCOMTR-MCNC: 80 MG/DL (ref 65–99)
GLUCOSE BLDC GLUCOMTR-MCNC: 81 MG/DL (ref 65–99)
GLUCOSE BLDC GLUCOMTR-MCNC: 83 MG/DL (ref 65–99)
GLUCOSE BLDC GLUCOMTR-MCNC: 92 MG/DL (ref 65–99)
GLUCOSE SERPL-MCNC: 53 MG/DL (ref 65–99)
GLUCOSE SERPL-MCNC: 68 MG/DL (ref 65–99)
HEMATOCRIT: 25.8 % (ref 39–51)
HGB BLD-MCNC: 7.8 GM/DL (ref 13–17)
INSULIN SERPL-ACNC: <1 MUNIT/L
LYMPHOCYTES # BLD: 0.8 THOUSAND/MCL (ref 1–4)
LYMPHOCYTES NFR BLD: 16 %
MAGNESIUM SERPL-MCNC: 1.5 MG/DL (ref 1.7–2.4)
MCH RBC QN AUTO: 26.4 PG (ref 26–34)
MCHC RBC AUTO-ENTMCNC: 30.2 GM/DL (ref 32–36.5)
MCV RBC AUTO: 87.5 FL (ref 78–100)
MONOCYTES # BLD: 0.6 THOUSAND/MCL (ref 0.3–0.9)
MONOCYTES NFR BLD: 13 %
NEUTROPHILS # BLD: 3.5 THOUSAND/MCL (ref 1.8–7.7)
NEUTROPHILS NFR BLD: 69 %
NEUTS SEG NFR BLD: ABNORMAL %
NRBC (NRBCRE): ABNORMAL
PHOSPHATE SERPL-MCNC: 2.2 MG/DL (ref 2.4–4.7)
PLATELET # BLD: 280 THOUSAND/MCL (ref 140–450)
POTASSIUM SERPL-SCNC: 3.5 MMOL/L (ref 3.4–5.1)
POTASSIUM SERPL-SCNC: 3.6 MMOL/L (ref 3.4–5.1)
PREALB SERPL NEPH-MCNC: 4.9 MG/DL (ref 18–38)
PROT SERPL-MCNC: 6.3 GM/DL (ref 6.4–8.2)
RBC # BLD: 2.95 MILLION/MCL (ref 4.5–5.9)
SODIUM SERPL-SCNC: 135 MMOL/L (ref 135–145)
SODIUM SERPL-SCNC: 138 MMOL/L (ref 135–145)
WBC # BLD: 5.1 THOUSAND/MCL (ref 4.2–11)

## 2018-11-21 LAB
ANALYZER ANC (IANC): ABNORMAL
ANION GAP SERPL CALC-SCNC: 16 MMOL/L (ref 10–20)
BUN SERPL-MCNC: 6 MG/DL (ref 6–20)
BUN/CREAT SERPL: 8 (ref 7–25)
CALCIUM SERPL-MCNC: 8.2 MG/DL (ref 8.4–10.2)
CHLORIDE: 103 MMOL/L (ref 98–107)
CO2 SERPL-SCNC: 24 MMOL/L (ref 21–32)
CREAT SERPL-MCNC: 0.71 MG/DL (ref 0.67–1.17)
ERYTHROCYTE [DISTWIDTH] IN BLOOD: 16.8 % (ref 11–15)
FERRITIN SERPL-MCNC: 852 NG/ML (ref 26–388)
GLUCOSE BLDC GLUCOMTR-MCNC: 120 MG/DL (ref 65–99)
GLUCOSE BLDC GLUCOMTR-MCNC: 134 MG/DL (ref 65–99)
GLUCOSE BLDC GLUCOMTR-MCNC: 136 MG/DL (ref 65–99)
GLUCOSE BLDC GLUCOMTR-MCNC: 141 MG/DL (ref 65–99)
GLUCOSE BLDC GLUCOMTR-MCNC: 141 MG/DL (ref 65–99)
GLUCOSE BLDC GLUCOMTR-MCNC: 148 MG/DL (ref 65–99)
GLUCOSE BLDC GLUCOMTR-MCNC: 149 MG/DL (ref 65–99)
GLUCOSE BLDC GLUCOMTR-MCNC: 163 MG/DL (ref 65–99)
GLUCOSE SERPL-MCNC: 153 MG/DL (ref 65–99)
HEMATOCRIT: 25.5 % (ref 39–51)
HGB BLD-MCNC: 7.7 GM/DL (ref 13–17)
IMMATURE RETIC FRACTION: 18.8 % (ref 1.5–16)
IRON SATN MFR SERPL: 14 % (ref 15–45)
IRON SERPL-MCNC: 21 MCG/DL (ref 65–175)
MAGNESIUM SERPL-MCNC: 2 MG/DL (ref 1.7–2.4)
MCH RBC QN AUTO: 26.5 PG (ref 26–34)
MCHC RBC AUTO-ENTMCNC: 30.2 GM/DL (ref 32–36.5)
MCV RBC AUTO: 87.6 FL (ref 78–100)
NRBC (NRBCRE): ABNORMAL
PHOSPHATE SERPL-MCNC: 3.2 MG/DL (ref 2.4–4.7)
PLATELET # BLD: 291 THOUSAND/MCL (ref 140–450)
POTASSIUM SERPL-SCNC: 4.5 MMOL/L (ref 3.4–5.1)
RBC # BLD: 2.91 MILLION/MCL (ref 4.5–5.9)
RETICS #: 52 THOUSAND/MCL (ref 10–120)
RETICS/RBC NFR: 1.8 % (ref 0.3–2.5)
SODIUM SERPL-SCNC: 138 MMOL/L (ref 135–145)
TIBC SERPL-MCNC: 149 MCG/DL (ref 250–450)
WBC # BLD: 6.3 THOUSAND/MCL (ref 4.2–11)

## 2018-11-26 ENCOUNTER — HOSPITAL (OUTPATIENT)
Dept: OTHER | Age: 78
End: 2018-11-26
Attending: INTERNAL MEDICINE

## 2018-11-29 ENCOUNTER — PRIOR ORIGINAL RECORDS (OUTPATIENT)
Dept: OTHER | Age: 78
End: 2018-11-29

## 2018-12-01 ENCOUNTER — HOSPITAL (OUTPATIENT)
Dept: OTHER | Age: 78
End: 2018-12-01
Attending: EMERGENCY MEDICINE

## 2018-12-01 LAB
ANALYZER ANC (IANC): ABNORMAL
ANION GAP SERPL CALC-SCNC: 11 MMOL/L (ref 10–20)
BASOPHILS # BLD: 0 THOUSAND/MCL (ref 0–0.3)
BASOPHILS NFR BLD: 0 %
BUN SERPL-MCNC: 19 MG/DL (ref 6–20)
BUN/CREAT SERPL: 29 (ref 7–25)
CALCIUM SERPL-MCNC: 8.4 MG/DL (ref 8.4–10.2)
CHLORIDE: 110 MMOL/L (ref 98–107)
CO2 SERPL-SCNC: 28 MMOL/L (ref 21–32)
CREAT SERPL-MCNC: 0.65 MG/DL (ref 0.67–1.17)
DIFFERENTIAL METHOD BLD: ABNORMAL
EOSINOPHIL # BLD: 0 THOUSAND/MCL (ref 0.1–0.5)
EOSINOPHIL NFR BLD: 0 %
ERYTHROCYTE [DISTWIDTH] IN BLOOD: 19.2 % (ref 11–15)
GLUCOSE BLDC GLUCOMTR-MCNC: 106 MG/DL (ref 65–99)
GLUCOSE BLDC GLUCOMTR-MCNC: 111 MG/DL (ref 65–99)
GLUCOSE BLDC GLUCOMTR-MCNC: 126 MG/DL (ref 65–99)
GLUCOSE BLDC GLUCOMTR-MCNC: 54 MG/DL (ref 65–99)
GLUCOSE BLDC GLUCOMTR-MCNC: 98 MG/DL (ref 65–99)
GLUCOSE SERPL-MCNC: 143 MG/DL (ref 65–99)
HEMATOCRIT: 30.2 % (ref 39–51)
HGB BLD-MCNC: 8.8 GM/DL (ref 13–17)
LYMPHOCYTES # BLD: 1.5 THOUSAND/MCL (ref 1–4)
LYMPHOCYTES NFR BLD: 14 %
MCH RBC QN AUTO: 27.7 PG (ref 26–34)
MCHC RBC AUTO-ENTMCNC: 29.1 GM/DL (ref 32–36.5)
MCV RBC AUTO: 95 FL (ref 78–100)
METAMYELOCYTES NFR BLD: 5 % (ref 0–2)
MONOCYTES # BLD: 0.4 THOUSAND/MCL (ref 0.3–0.9)
MONOCYTES NFR BLD: 4 %
MYELOCYTES NFR BLD: 1 %
NEUTROPHILS # BLD: 8.1 THOUSAND/MCL (ref 1.8–7.7)
NEUTS BAND NFR BLD: 2 % (ref 0–10)
NEUTS SEG NFR BLD: 74 %
NRBC (NRBCRE): 0 /100 WBC
OVALOCYTES (OVALO): ABNORMAL
PATH REV BLD -IMP: ABNORMAL
PLAT MORPH BLD: NORMAL
PLATELET # BLD: 243 THOUSAND/MCL (ref 140–450)
POLYCHROMASIA (POLY): ABNORMAL
POTASSIUM SERPL-SCNC: 3.6 MMOL/L (ref 3.4–5.1)
RBC # BLD: 3.18 MILLION/MCL (ref 4.5–5.9)
SHISTOCYTES (SHSTO): ABNORMAL
SODIUM SERPL-SCNC: 145 MMOL/L (ref 135–145)
WBC # BLD: 10.7 THOUSAND/MCL (ref 4.2–11)

## 2018-12-04 LAB
ANALYZER ANC (IANC): ABNORMAL
ANION GAP SERPL CALC-SCNC: 13 MMOL/L (ref 10–20)
APPEARANCE UR: ABNORMAL
BACTERIA #/AREA URNS HPF: ABNORMAL /HPF
BASOPHILS # BLD: 0 THOUSAND/MCL (ref 0–0.3)
BASOPHILS NFR BLD: 0 %
BILIRUB UR QL: NEGATIVE
BUN SERPL-MCNC: 21 MG/DL (ref 6–20)
BUN/CREAT SERPL: 32 (ref 7–25)
CALCIUM SERPL-MCNC: 9.1 MG/DL (ref 8.4–10.2)
CHLORIDE: 110 MMOL/L (ref 98–107)
CO2 SERPL-SCNC: 27 MMOL/L (ref 21–32)
COLOR UR: YELLOW
CREAT SERPL-MCNC: 0.66 MG/DL (ref 0.67–1.17)
DIFFERENTIAL METHOD BLD: ABNORMAL
EOSINOPHIL # BLD: 0 THOUSAND/MCL (ref 0.1–0.5)
EOSINOPHIL NFR BLD: 0 %
ERYTHROCYTE [DISTWIDTH] IN BLOOD: 19.6 % (ref 11–15)
GLUCOSE BLDC GLUCOMTR-MCNC: 102 MG/DL (ref 65–99)
GLUCOSE BLDC GLUCOMTR-MCNC: 106 MG/DL (ref 65–99)
GLUCOSE BLDC GLUCOMTR-MCNC: 118 MG/DL (ref 65–99)
GLUCOSE BLDC GLUCOMTR-MCNC: 123 MG/DL (ref 65–99)
GLUCOSE BLDC GLUCOMTR-MCNC: 124 MG/DL (ref 65–99)
GLUCOSE BLDC GLUCOMTR-MCNC: 130 MG/DL (ref 65–99)
GLUCOSE BLDC GLUCOMTR-MCNC: 138 MG/DL (ref 65–99)
GLUCOSE BLDC GLUCOMTR-MCNC: 68 MG/DL (ref 65–99)
GLUCOSE BLDC GLUCOMTR-MCNC: 70 MG/DL (ref 65–99)
GLUCOSE BLDC GLUCOMTR-MCNC: 73 MG/DL (ref 65–99)
GLUCOSE BLDC GLUCOMTR-MCNC: 86 MG/DL (ref 65–99)
GLUCOSE SERPL-MCNC: 79 MG/DL (ref 65–99)
GLUCOSE UR-MCNC: NEGATIVE MG/DL
HEMATOCRIT: 33.9 % (ref 39–51)
HGB BLD-MCNC: 10 GM/DL (ref 13–17)
HGB UR QL: ABNORMAL
HYALINE CASTS #/AREA URNS LPF: ABNORMAL /LPF (ref 0–5)
KETONES UR-MCNC: NEGATIVE MG/DL
LEUKOCYTE ESTERASE UR QL STRIP: NEGATIVE
LYMPHOCYTES # BLD: 0.6 THOUSAND/MCL (ref 1–4)
LYMPHOCYTES NFR BLD: 5 %
MCH RBC QN AUTO: 27.8 PG (ref 26–34)
MCHC RBC AUTO-ENTMCNC: 29.5 GM/DL (ref 32–36.5)
MCV RBC AUTO: 94.2 FL (ref 78–100)
METAMYELOCYTES NFR BLD: 3 % (ref 0–2)
MONOCYTES # BLD: 0.6 THOUSAND/MCL (ref 0.3–0.9)
MONOCYTES NFR BLD: 5 %
NEUTROPHILS # BLD: 11.1 THOUSAND/MCL (ref 1.8–7.7)
NEUTS BAND NFR BLD: 3 % (ref 0–10)
NEUTS SEG NFR BLD: 84 %
NITRITE UR QL: NEGATIVE
NRBC (NRBCRE): 0 /100 WBC
OVALOCYTES (OVALO): ABNORMAL
PATH REV BLD -IMP: ABNORMAL
PH UR: 7 UNIT (ref 5–7)
PLAT MORPH BLD: NORMAL
PLATELET # BLD: 224 THOUSAND/MCL (ref 140–450)
POLYCHROMASIA (POLY): ABNORMAL
POTASSIUM SERPL-SCNC: 4 MMOL/L (ref 3.4–5.1)
PROT UR QL: NEGATIVE MG/DL
RBC # BLD: 3.6 MILLION/MCL (ref 4.5–5.9)
RBC #/AREA URNS HPF: ABNORMAL /HPF (ref 0–3)
SODIUM SERPL-SCNC: 146 MMOL/L (ref 135–145)
SP GR UR: 1.02 (ref 1–1.03)
SPECIMEN SOURCE: ABNORMAL
SQUAMOUS #/AREA URNS HPF: ABNORMAL /HPF (ref 0–5)
UROBILINOGEN UR QL: 0.2 MG/DL (ref 0–1)
WBC # BLD: 12.8 THOUSAND/MCL (ref 4.2–11)
WBC #/AREA URNS HPF: ABNORMAL /HPF (ref 0–5)

## 2018-12-05 ENCOUNTER — HOSPITAL (OUTPATIENT)
Dept: OTHER | Age: 78
End: 2018-12-05
Attending: INTERNAL MEDICINE

## 2018-12-05 LAB
ALBUMIN SERPL-MCNC: 2.3 GM/DL (ref 3.6–5.1)
ALBUMIN/GLOB SERPL: 0.7 {RATIO} (ref 1–2.4)
ALP SERPL-CCNC: 90 UNIT/L (ref 45–117)
ALT SERPL-CCNC: 13 UNIT/L
ANALYZER ANC (IANC): ABNORMAL
ANION GAP SERPL CALC-SCNC: 13 MMOL/L (ref 10–20)
AST SERPL-CCNC: 14 UNIT/L
BASOPHILS # BLD: 0 THOUSAND/MCL (ref 0–0.3)
BASOPHILS NFR BLD: 0 %
BILIRUB SERPL-MCNC: 0.2 MG/DL (ref 0.2–1)
BUN SERPL-MCNC: 20 MG/DL (ref 6–20)
BUN/CREAT SERPL: 36 (ref 7–25)
BURR CELLS (BURC): ABNORMAL
CALCIUM SERPL-MCNC: 8.4 MG/DL (ref 8.4–10.2)
CHLORIDE: 111 MMOL/L (ref 98–107)
CO2 SERPL-SCNC: 24 MMOL/L (ref 21–32)
CREAT SERPL-MCNC: 0.56 MG/DL (ref 0.67–1.17)
DIFFERENTIAL METHOD BLD: ABNORMAL
EOSINOPHIL # BLD: 0 THOUSAND/MCL (ref 0.1–0.5)
EOSINOPHIL NFR BLD: 0 %
ERYTHROCYTE [DISTWIDTH] IN BLOOD: 19.7 % (ref 11–15)
GLOBULIN SER-MCNC: 3.5 GM/DL (ref 2–4)
GLUCOSE BLDC GLUCOMTR-MCNC: 110 MG/DL (ref 65–99)
GLUCOSE BLDC GLUCOMTR-MCNC: 32 MG/DL (ref 65–99)
GLUCOSE BLDC GLUCOMTR-MCNC: 53 MG/DL (ref 65–99)
GLUCOSE BLDC GLUCOMTR-MCNC: 72 MG/DL (ref 65–99)
GLUCOSE BLDC GLUCOMTR-MCNC: 80 MG/DL (ref 65–99)
GLUCOSE BLDC GLUCOMTR-MCNC: 82 MG/DL (ref 65–99)
GLUCOSE BLDC GLUCOMTR-MCNC: 82 MG/DL (ref 65–99)
GLUCOSE SERPL-MCNC: 44 MG/DL (ref 65–99)
HEMATOCRIT: 28.5 % (ref 39–51)
HGB BLD-MCNC: 8.5 GM/DL (ref 13–17)
LYMPHOCYTES # BLD: 0.4 THOUSAND/MCL (ref 1–4)
LYMPHOCYTES NFR BLD: 4 %
MAGNESIUM SERPL-MCNC: 1.9 MG/DL (ref 1.7–2.4)
MCH RBC QN AUTO: 27.7 PG (ref 26–34)
MCHC RBC AUTO-ENTMCNC: 29.8 GM/DL (ref 32–36.5)
MCV RBC AUTO: 92.8 FL (ref 78–100)
MONOCYTES # BLD: 0.4 THOUSAND/MCL (ref 0.3–0.9)
MONOCYTES NFR BLD: 4 %
NEUTROPHILS # BLD: 9.3 THOUSAND/MCL (ref 1.8–7.7)
NEUTS SEG NFR BLD: 92 %
NRBC (NRBCRE): 0 /100 WBC
OVALOCYTES (OVALO): ABNORMAL
PATH REV BLD -IMP: ABNORMAL
PLAT MORPH BLD: NORMAL
PLATELET # BLD: 201 THOUSAND/MCL (ref 140–450)
POTASSIUM SERPL-SCNC: 4 MMOL/L (ref 3.4–5.1)
PROT SERPL-MCNC: 5.8 GM/DL (ref 6.4–8.2)
RBC # BLD: 3.07 MILLION/MCL (ref 4.5–5.9)
SODIUM SERPL-SCNC: 144 MMOL/L (ref 135–145)
WBC # BLD: 10.1 THOUSAND/MCL (ref 4.2–11)
WBC MORPH BLD: NORMAL

## 2018-12-06 LAB
GLUCOSE BLDC GLUCOMTR-MCNC: 73 MG/DL (ref 65–99)
GLUCOSE BLDC GLUCOMTR-MCNC: 78 MG/DL (ref 65–99)
GLUCOSE BLDC GLUCOMTR-MCNC: 80 MG/DL (ref 65–99)
GLUCOSE BLDC GLUCOMTR-MCNC: 90 MG/DL (ref 65–99)

## 2018-12-07 ENCOUNTER — PRIOR ORIGINAL RECORDS (OUTPATIENT)
Dept: OTHER | Age: 78
End: 2018-12-07

## 2018-12-21 ENCOUNTER — PRIOR ORIGINAL RECORDS (OUTPATIENT)
Dept: OTHER | Age: 78
End: 2018-12-21

## 2018-12-28 ENCOUNTER — PRIOR ORIGINAL RECORDS (OUTPATIENT)
Dept: OTHER | Age: 78
End: 2018-12-28

## 2018-12-31 ENCOUNTER — PRIOR ORIGINAL RECORDS (OUTPATIENT)
Dept: OTHER | Age: 78
End: 2018-12-31

## 2019-01-11 ENCOUNTER — PRIOR ORIGINAL RECORDS (OUTPATIENT)
Dept: OTHER | Age: 79
End: 2019-01-11

## 2019-01-18 ENCOUNTER — PRIOR ORIGINAL RECORDS (OUTPATIENT)
Dept: OTHER | Age: 79
End: 2019-01-18

## 2019-01-25 ENCOUNTER — PRIOR ORIGINAL RECORDS (OUTPATIENT)
Dept: OTHER | Age: 79
End: 2019-01-25

## 2019-02-05 ENCOUNTER — HOSPITAL (OUTPATIENT)
Dept: OTHER | Age: 79
End: 2019-02-05
Attending: INTERNAL MEDICINE

## 2019-02-08 ENCOUNTER — PRIOR ORIGINAL RECORDS (OUTPATIENT)
Dept: OTHER | Age: 79
End: 2019-02-08

## 2019-02-15 ENCOUNTER — PRIOR ORIGINAL RECORDS (OUTPATIENT)
Dept: OTHER | Age: 79
End: 2019-02-15

## 2019-02-22 ENCOUNTER — PRIOR ORIGINAL RECORDS (OUTPATIENT)
Dept: OTHER | Age: 79
End: 2019-02-22

## 2019-03-08 ENCOUNTER — PRIOR ORIGINAL RECORDS (OUTPATIENT)
Dept: OTHER | Age: 79
End: 2019-03-08

## 2019-03-15 ENCOUNTER — PRIOR ORIGINAL RECORDS (OUTPATIENT)
Dept: OTHER | Age: 79
End: 2019-03-15

## 2020-10-09 LAB
% SATURATION: 15 % (CALC) (ref 15–60)
% SATURATION: 18 % (CALC) (ref 15–60)
ALBUMIN/GLOB SERPL: 0.9 (CALC) (ref 1–2.5)
ALBUMIN/GLOB SERPL: 0.9 (CALC) (ref 1–2.5)
ALBUMIN/GLOB SERPL: 1 (CALC) (ref 1–2.5)
ALBUMIN/GLOB SERPL: 1.1 (CALC)
ALBUMIN/GLOB SERPL: 1.1 (CALC) (ref 1–2.5)
ALBUMIN/GLOB SERPL: 1.2 (CALC) (ref 1–2.5)
ALBUMIN/GLOB SERPL: 1.3 (CALC) (ref 1–2.5)
ALBUMIN/GLOB SERPL: 1.4 (CALC) (ref 1–2.5)
ALBUMIN/GLOB SERPL: 1.5 (CALC) (ref 1–2.5)
ALBUMIN: 2.9 G/DL (ref 3.6–5.1)
ALBUMIN: 3 G/DL (ref 3.6–5.1)
ALBUMIN: 3.1 G/DL (ref 3.6–5.1)
ALBUMIN: 3.2 G/DL (ref 3.6–5.1)
ALBUMIN: 3.3 G/DL
ALBUMIN: 3.3 G/DL (ref 3.6–5.1)
ALBUMIN: 3.4 G/DL (ref 3.6–5.1)
ALBUMIN: 3.5 G/DL (ref 3.6–5.1)
ALBUMIN: 3.5 G/DL (ref 3.6–5.1)
ALBUMIN: 3.6 G/DL (ref 3.6–5.1)
ALBUMIN: 3.7 G/DL (ref 3.6–5.1)
ALBUMIN: 3.8 G/DL (ref 3.6–5.1)
ALKALINE PHOSPHATASE: 101 UNIT/L (ref 40–115)
ALKALINE PHOSPHATASE: 102 UNIT/L
ALKALINE PHOSPHATASE: 102 UNIT/L (ref 40–115)
ALKALINE PHOSPHATASE: 104 UNIT/L (ref 40–115)
ALKALINE PHOSPHATASE: 105 UNIT/L (ref 40–115)
ALKALINE PHOSPHATASE: 105 UNIT/L (ref 40–115)
ALKALINE PHOSPHATASE: 108 UNIT/L (ref 40–115)
ALKALINE PHOSPHATASE: 108 UNIT/L (ref 40–115)
ALKALINE PHOSPHATASE: 110 UNIT/L (ref 40–115)
ALKALINE PHOSPHATASE: 110 UNIT/L (ref 40–115)
ALKALINE PHOSPHATASE: 111 UNIT/L (ref 40–115)
ALKALINE PHOSPHATASE: 117 UNIT/L (ref 40–115)
ALKALINE PHOSPHATASE: 118 UNIT/L (ref 40–115)
ALKALINE PHOSPHATASE: 126 UNIT/L (ref 40–115)
ALKALINE PHOSPHATASE: 73 UNIT/L (ref 40–115)
ALKALINE PHOSPHATASE: 78 UNIT/L (ref 40–115)
ALKALINE PHOSPHATASE: 86 UNIT/L (ref 40–115)
ALKALINE PHOSPHATASE: 86 UNIT/L (ref 40–115)
ALKALINE PHOSPHATASE: 92 UNIT/L (ref 40–115)
ALKALINE PHOSPHATASE: 95 UNIT/L (ref 40–115)
ALKALINE PHOSPHATASE: 97 UNIT/L (ref 40–115)
ALT: 10 UNIT/L
ALT: 10 UNIT/L (ref 9–46)
ALT: 11 UNIT/L (ref 9–46)
ALT: 14 UNIT/L (ref 9–46)
ALT: 4 UNIT/L (ref 9–46)
ALT: 6 UNIT/L (ref 9–46)
ALT: 6 UNIT/L (ref 9–46)
ALT: 7 UNIT/L (ref 9–46)
ALT: 8 UNIT/L (ref 9–46)
ALT: 9 UNIT/L (ref 9–46)
AST: 10 UNIT/L (ref 10–35)
AST: 10 UNIT/L (ref 10–35)
AST: 11 UNIT/L
AST: 11 UNIT/L (ref 10–35)
AST: 12 UNIT/L (ref 10–35)
AST: 13 UNIT/L (ref 10–35)
AST: 14 UNIT/L (ref 10–35)
AST: 20 UNIT/L (ref 10–35)
AST: 6 UNIT/L (ref 10–35)
AST: 9 UNIT/L (ref 10–35)
BASO%: 0 %
BASO%: 0.1 %
BASO%: 0.1 %
BASO%: 0.2 %
BASO%: 0.3 %
BASO%: 0.4 %
BASO%: 0.5 %
BASO%: 0.6 %
BASO%: 0.7 %
BASO%: 0.8 %
BASO%: 0.8 %
BASO%: 0.9 %
BASO%: 0.9 %
BASO%: 1.5 %
BASO%: 1.5 %
BASO: 0 10^3/UL
BASO: 0.1 10^3/UL
BILIRUBIN, TOTAL: 0.3 MG/DL
BILIRUBIN, TOTAL: 0.3 MG/DL (ref 0.2–1.2)
BILIRUBIN, TOTAL: 0.4 MG/DL (ref 0.2–1.2)
BILIRUBIN, TOTAL: 0.5 MG/DL (ref 0.2–1.2)
BILIRUBIN, TOTAL: 0.5 MG/DL (ref 0.2–1.2)
BUN/CREATININE RATIO: 22 (CALC) (ref 6–22)
BUN/CREATININE RATIO: 22 (CALC) (ref 6–22)
BUN/CREATININE RATIO: 24 (CALC) (ref 6–22)
BUN/CREATININE RATIO: 25 (CALC) (ref 6–22)
BUN/CREATININE RATIO: ABNORMAL (CALC) (ref 6–22)
BUN/CREATININE RATIO: NORMAL (CALC) (ref 6–22)
CA 125: 11 UNIT/ML
CA 125: 17 UNIT/ML
CA 125: 6 UNIT/ML
CA 125: 9 UNIT/ML
CALCIUM: 8.1 MG/DL (ref 8.6–10.3)
CALCIUM: 8.3 MG/DL (ref 8.6–10.3)
CALCIUM: 8.4 MG/DL (ref 8.6–10.3)
CALCIUM: 8.5 MG/DL
CALCIUM: 8.6 MG/DL (ref 8.6–10.3)
CALCIUM: 8.6 MG/DL (ref 8.6–10.3)
CALCIUM: 8.7 MG/DL (ref 8.6–10.3)
CALCIUM: 8.8 MG/DL (ref 8.6–10.3)
CALCIUM: 8.9 MG/DL (ref 8.6–10.3)
CALCIUM: 8.9 MG/DL (ref 8.6–10.3)
CALCIUM: 9 MG/DL (ref 8.6–10.3)
CALCIUM: 9.2 MG/DL (ref 8.6–10.3)
CARBON DIOXIDE: 19 MMOL/L (ref 20–31)
CARBON DIOXIDE: 20 MMOL/L
CARBON DIOXIDE: 20 MMOL/L (ref 20–31)
CARBON DIOXIDE: 21 MMOL/L (ref 20–31)
CARBON DIOXIDE: 21 MMOL/L (ref 20–32)
CARBON DIOXIDE: 22 MMOL/L (ref 20–31)
CARBON DIOXIDE: 22 MMOL/L (ref 20–32)
CARBON DIOXIDE: 23 MMOL/L (ref 20–31)
CARBON DIOXIDE: 23 MMOL/L (ref 20–32)
CARBON DIOXIDE: 23 MMOL/L (ref 20–32)
CARBON DIOXIDE: 24 MMOL/L (ref 20–32)
CARBON DIOXIDE: 24 MMOL/L (ref 20–32)
CARBON DIOXIDE: 25 MMOL/L (ref 20–31)
CARBON DIOXIDE: 25 MMOL/L (ref 20–31)
CARBON DIOXIDE: 26 MMOL/L (ref 20–32)
CHLORIDE: 103 MMOL/L (ref 98–110)
CHLORIDE: 104 MMOL/L (ref 98–110)
CHLORIDE: 105 MMOL/L (ref 98–110)
CHLORIDE: 105 MMOL/L (ref 98–110)
CHLORIDE: 106 MMOL/L
CHLORIDE: 106 MMOL/L (ref 98–110)
CHLORIDE: 107 MMOL/L (ref 98–110)
CHLORIDE: 108 MMOL/L (ref 98–110)
CHLORIDE: 110 MMOL/L (ref 98–110)
CHLORIDE: 110 MMOL/L (ref 98–110)
CRCL (C&G) (MOSAIQ HL): 102.06 ML/MIN
CRCL (C&G) (MOSAIQ HL): 58.9 ML/MIN
CRCL (C&G) (MOSAIQ HL): 59.26 ML/MIN
CRCL (C&G) (MOSAIQ HL): 60.63 ML/MIN
CRCL (C&G) (MOSAIQ HL): 61.8 ML/MIN
CRCL (C&G) (MOSAIQ HL): 63.19 ML/MIN
CRCL (C&G) (MOSAIQ HL): 63.39 ML/MIN
CRCL (C&G) (MOSAIQ HL): 64.69 ML/MIN
CRCL (C&G) (MOSAIQ HL): 64.77 ML/MIN
CRCL (C&G) (MOSAIQ HL): 65.59 ML/MIN
CRCL (C&G) (MOSAIQ HL): 66 ML/MIN
CRCL (C&G) (MOSAIQ HL): 66.17 ML/MIN
CRCL (C&G) (MOSAIQ HL): 66.57 ML/MIN
CRCL (C&G) (MOSAIQ HL): 66.64 ML/MIN
CRCL (C&G) (MOSAIQ HL): 66.92 ML/MIN
CRCL (C&G) (MOSAIQ HL): 68.06 ML/MIN
CRCL (C&G) (MOSAIQ HL): 70.07 ML/MIN
CRCL (C&G) (MOSAIQ HL): 74.74 ML/MIN
CRCL (C&G) (MOSAIQ HL): 81.39 ML/MIN
CRCL (C&G) (MOSAIQ HL): 84.22 ML/MIN
CRCL (C&G) (MOSAIQ HL): 93.47 ML/MIN
CREATININE CLEARANCE (MOSAIQ HL): 100 ML/MIN
CREATININE CLEARANCE (MOSAIQ HL): 47.5 ML/MIN
CREATININE CLEARANCE (MOSAIQ HL): 48.8 ML/MIN
CREATININE CLEARANCE (MOSAIQ HL): 49.3 ML/MIN
CREATININE CLEARANCE (MOSAIQ HL): 50.3 ML/MIN
CREATININE CLEARANCE (MOSAIQ HL): 51.3 ML/MIN
CREATININE CLEARANCE (MOSAIQ HL): 52.6 ML/MIN
CREATININE CLEARANCE (MOSAIQ HL): 52.9 ML/MIN
CREATININE CLEARANCE (MOSAIQ HL): 54 ML/MIN
CREATININE CLEARANCE (MOSAIQ HL): 54.6 ML/MIN
CREATININE CLEARANCE (MOSAIQ HL): 55.2 ML/MIN
CREATININE CLEARANCE (MOSAIQ HL): 55.2 ML/MIN
CREATININE CLEARANCE (MOSAIQ HL): 56.1 ML/MIN
CREATININE CLEARANCE (MOSAIQ HL): 56.2 ML/MIN
CREATININE CLEARANCE (MOSAIQ HL): 56.4 ML/MIN
CREATININE CLEARANCE (MOSAIQ HL): 61 ML/MIN
CREATININE CLEARANCE (MOSAIQ HL): 62.9 ML/MIN
CREATININE CLEARANCE (MOSAIQ HL): 63.3 ML/MIN
CREATININE CLEARANCE (MOSAIQ HL): 73.5 ML/MIN
CREATININE CLEARANCE (MOSAIQ HL): 78.1 ML/MIN
CREATININE CLEARANCE (MOSAIQ HL): 90.9 ML/MIN
CREATININE: 0.5 MG/DL (ref 0.7–1.11)
CREATININE: 0.55 MG/DL (ref 0.7–1.11)
CREATININE: 0.64 MG/DL (ref 0.7–1.11)
CREATININE: 0.68 MG/DL (ref 0.7–1.11)
CREATININE: 0.79 MG/DL (ref 0.7–1.11)
CREATININE: 0.82 MG/DL (ref 0.7–1.11)
CREATININE: 0.82 MG/DL (ref 0.7–1.18)
CREATININE: 0.89 MG/DL (ref 0.7–1.11)
CREATININE: 0.9 MG/DL (ref 0.7–1.18)
CREATININE: 0.92 MG/DL (ref 0.7–1.18)
CREATININE: 0.93 MG/DL (ref 0.7–1.18)
CREATININE: 0.94 MG/DL
CREATININE: 0.95 MG/DL (ref 0.7–1.11)
CREATININE: 0.96 MG/DL (ref 0.7–1.18)
CREATININE: 0.99 MG/DL (ref 0.7–1.18)
CREATININE: 1.01 MG/DL (ref 0.7–1.18)
CREATININE: 1.03 MG/DL (ref 0.7–1.18)
CREATININE: 1.04 MG/DL (ref 0.7–1.18)
CREATININE: 1.07 MG/DL (ref 0.7–1.18)
EGFR AFRICAN AMERICAN: 105 ML/MIN/1.73M2
EGFR AFRICAN AMERICAN: 107 ML/MIN/1.73M2
EGFR AFRICAN AMERICAN: 114 ML/MIN/1.73M2
EGFR AFRICAN AMERICAN: 119 ML/MIN/1.73M2
EGFR AFRICAN AMERICAN: 76 ML/MIN/1.73M2
EGFR AFRICAN AMERICAN: 79 ML/MIN/1.73M2
EGFR AFRICAN AMERICAN: 80 ML/MIN/1.73M2
EGFR AFRICAN AMERICAN: 82 ML/MIN/1.73M2
EGFR AFRICAN AMERICAN: 84 ML/MIN/1.73M2
EGFR AFRICAN AMERICAN: 87 ML/MIN/1.73M2
EGFR AFRICAN AMERICAN: 87 ML/MIN/1.73M2
EGFR AFRICAN AMERICAN: 90 ML/MIN/1.73M2
EGFR AFRICAN AMERICAN: 91 ML/MIN/1.73M2
EGFR AFRICAN AMERICAN: 91 ML/MIN/1.73M2
EGFR AFRICAN AMERICAN: 92 ML/MIN/1.73M2
EGFR AFRICAN AMERICAN: 94 ML/MIN/1.73M2
EGFR AFRICAN AMERICAN: 94 ML/MIN/1.73M2
EGFR AFRICAN AMERICAN: 97 ML/MIN/1.73M2
EGFR AFRICAN AMERICAN: 98 ML/MIN/1.73M2
EGFR AFRICAN AMERICAN: 98 ML/MIN/1.73M2
EGFR NON-AFR. AMERICAN: 102 ML/MIN/1.73M2
EGFR NON-AFR. AMERICAN: 66 ML/MIN/1.73M2
EGFR NON-AFR. AMERICAN: 68 ML/MIN/1.73M2
EGFR NON-AFR. AMERICAN: 69 ML/MIN/1.73M2
EGFR NON-AFR. AMERICAN: 70 ML/MIN/1.73M2
EGFR NON-AFR. AMERICAN: 72 ML/MIN/1.73M2
EGFR NON-AFR. AMERICAN: 75 ML/MIN/1.73M2
EGFR NON-AFR. AMERICAN: 75 ML/MIN/1.73M2
EGFR NON-AFR. AMERICAN: 78 ML/MIN/1.73M2
EGFR NON-AFR. AMERICAN: 79 ML/MIN/1.73M2
EGFR NON-AFR. AMERICAN: 81 ML/MIN/1.73M2
EGFR NON-AFR. AMERICAN: 81 ML/MIN/1.73M2
EGFR NON-AFR. AMERICAN: 84 ML/MIN/1.73M2
EGFR NON-AFR. AMERICAN: 85 ML/MIN/1.73M2
EGFR NON-AFR. AMERICAN: 85 ML/MIN/1.73M2
EGFR NON-AFR. AMERICAN: 90 ML/MIN/1.73M2
EGFR NON-AFR. AMERICAN: 92 ML/MIN/1.73M2
EGFR NON-AFR. AMERICAN: 98 ML/MIN/1.73M2
EOS%: 0 %
EOS%: 0.1 %
EOS%: 0.1 %
EOS%: 0.2 %
EOS%: 0.3 %
EOS%: 0.5 %
EOS%: 0.8 %
EOS%: 1.3 %
EOS%: 1.3 %
EOS%: 1.7 %
EOS%: 1.8 %
EOS%: 2 %
EOS%: 2 %
EOS%: 2.3 %
EOS%: 2.4 %
EOS%: 2.6 %
EOS%: 2.6 %
EOS%: 2.7 %
EOS%: 2.9 %
EOS%: 2.9 %
EOS%: 3 %
EOS%: 3.8 %
EOS%: 4.5 %
EOS%: 5.5 %
EOS%: 7.4 %
EOS%: 8.2 %
EOS: 0 10^3/UL
EOS: 0.1 10^3/UL
EOS: 0.2 10^3/UL
EOS: 0.3 10^3/UL
EOS: 0.3 10^3/UL
FERRITIN: 386 NG/ML (ref 20–380)
FERRITIN: 401 NG/ML (ref 20–380)
GLOBULIN: 2.1 G/DL (CALC) (ref 1.9–3.7)
GLOBULIN: 2.3 G/DL (CALC) (ref 1.9–3.7)
GLOBULIN: 2.3 G/DL (CALC) (ref 1.9–3.7)
GLOBULIN: 2.6 G/DL (CALC) (ref 1.9–3.7)
GLOBULIN: 2.8 G/DL (CALC) (ref 1.9–3.7)
GLOBULIN: 2.8 G/DL (CALC) (ref 1.9–3.7)
GLOBULIN: 2.9 G/DL (CALC) (ref 1.9–3.7)
GLOBULIN: 3 G/DL (CALC) (ref 1.9–3.7)
GLOBULIN: 3.1 G/DL (CALC)
GLOBULIN: 3.1 G/DL (CALC) (ref 1.9–3.7)
GLOBULIN: 3.3 G/DL (CALC) (ref 1.9–3.7)
GLOBULIN: 3.4 G/DL (CALC) (ref 1.9–3.7)
GLUCOSE: 100 MG/DL (ref 65–99)
GLUCOSE: 101 MG/DL (ref 65–99)
GLUCOSE: 110 MG/DL (ref 65–99)
GLUCOSE: 112 MG/DL (ref 65–99)
GLUCOSE: 124 MG/DL (ref 65–99)
GLUCOSE: 124 MG/DL (ref 65–99)
GLUCOSE: 126 MG/DL (ref 65–99)
GLUCOSE: 134 MG/DL (ref 65–99)
GLUCOSE: 140 MG/DL (ref 65–99)
GLUCOSE: 67 MG/DL (ref 65–99)
GLUCOSE: 69 MG/DL (ref 65–99)
GLUCOSE: 81 MG/DL (ref 65–99)
GLUCOSE: 83 MG/DL (ref 65–99)
GLUCOSE: 84 MG/DL (ref 65–99)
GLUCOSE: 85 MG/DL (ref 65–99)
GLUCOSE: 85 MG/DL (ref 65–99)
GLUCOSE: 86 MG/DL (ref 65–99)
GLUCOSE: 90 MG/DL
GLUCOSE: 92 MG/DL (ref 65–99)
GLUCOSE: 94 MG/DL (ref 65–99)
GLUCOSE: 98 MG/DL (ref 65–99)
HCT: 22.5 % (ref 38–54)
HCT: 24.7 % (ref 38–54)
HCT: 25.1 % (ref 38–54)
HCT: 25.5 % (ref 38–54)
HCT: 25.8 % (ref 38–54)
HCT: 26.2 % (ref 38–54)
HCT: 26.3 % (ref 38–54)
HCT: 26.5 % (ref 38–54)
HCT: 26.6 % (ref 38–54)
HCT: 26.7 % (ref 38–54)
HCT: 27.3 % (ref 38–54)
HCT: 27.5 % (ref 38–54)
HCT: 27.6 % (ref 38–54)
HCT: 27.6 % (ref 38–54)
HCT: 27.7 % (ref 38–54)
HCT: 28.3 % (ref 38–54)
HCT: 28.6 % (ref 38–54)
HCT: 28.7 % (ref 38–54)
HCT: 28.8 % (ref 38–54)
HCT: 28.9 % (ref 38–54)
HCT: 29 % (ref 38–54)
HCT: 29.2 % (ref 38–54)
HCT: 29.4 % (ref 38–54)
HCT: 29.4 % (ref 38–54)
HCT: 29.6 % (ref 38–54)
HCT: 29.7 % (ref 38–54)
HCT: 30 % (ref 38–54)
HCT: 30.1 % (ref 38–54)
HCT: 30.2 % (ref 38–54)
HCT: 30.8 % (ref 38–54)
HCT: 30.8 % (ref 38–54)
HCT: 30.9 % (ref 38–54)
HCT: 31 % (ref 38–54)
HCT: 31.6 % (ref 38–54)
HCT: 31.7 % (ref 38–54)
HCT: 32 % (ref 38–54)
HCT: 32.1 % (ref 38–54)
HCT: 33.1 % (ref 38–54)
HGB: 10.2 G/DL (ref 12–18)
HGB: 10.3 G/DL (ref 12–18)
HGB: 10.8 G/DL (ref 12–18)
HGB: 10.8 G/DL (ref 12–18)
HGB: 7.2 G/DL (ref 12–18)
HGB: 7.6 G/DL (ref 12–18)
HGB: 7.7 G/DL (ref 12–18)
HGB: 7.9 G/DL (ref 12–18)
HGB: 8 G/DL (ref 12–18)
HGB: 8 G/DL (ref 12–18)
HGB: 8.1 G/DL (ref 12–18)
HGB: 8.2 G/DL (ref 12–18)
HGB: 8.4 G/DL (ref 12–18)
HGB: 8.5 G/DL (ref 12–18)
HGB: 8.6 G/DL (ref 12–18)
HGB: 8.7 G/DL (ref 12–18)
HGB: 8.9 G/DL (ref 12–18)
HGB: 9 G/DL (ref 12–18)
HGB: 9 G/DL (ref 12–18)
HGB: 9.1 G/DL (ref 12–18)
HGB: 9.1 G/DL (ref 12–18)
HGB: 9.2 G/DL (ref 12–18)
HGB: 9.2 G/DL (ref 12–18)
HGB: 9.3 G/DL (ref 12–18)
HGB: 9.3 G/DL (ref 12–18)
HGB: 9.5 G/DL (ref 12–18)
HGB: 9.5 G/DL (ref 12–18)
HGB: 9.6 G/DL (ref 12–18)
HGB: 9.7 G/DL (ref 12–18)
HGB: 9.7 G/DL (ref 12–18)
IRON BINDING CAPACITY: 164 MCG/DL (CALC) (ref 250–425)
IRON BINDING CAPACITY: 181 MCG/DL (CALC) (ref 250–425)
IRON, TOTAL: 28 MCG/DL (ref 50–180)
IRON, TOTAL: 29 MCG/DL (ref 50–180)
LYMPH%: 10.1 % (ref 12–44)
LYMPH%: 10.4 % (ref 12–44)
LYMPH%: 11.1 % (ref 12–44)
LYMPH%: 13 % (ref 12–44)
LYMPH%: 14 % (ref 12–44)
LYMPH%: 14.8 % (ref 12–44)
LYMPH%: 14.8 % (ref 12–44)
LYMPH%: 15.1 % (ref 12–44)
LYMPH%: 17 % (ref 12–44)
LYMPH%: 17.7 % (ref 12–44)
LYMPH%: 18.1 % (ref 12–44)
LYMPH%: 19.1 % (ref 12–44)
LYMPH%: 19.1 % (ref 12–44)
LYMPH%: 19.7 % (ref 12–44)
LYMPH%: 20.5 % (ref 12–44)
LYMPH%: 21.4 % (ref 12–44)
LYMPH%: 22.7 % (ref 12–44)
LYMPH%: 22.8 % (ref 12–44)
LYMPH%: 22.9 % (ref 12–44)
LYMPH%: 23.4 % (ref 12–44)
LYMPH%: 23.5 % (ref 12–44)
LYMPH%: 24 % (ref 12–44)
LYMPH%: 24.4 % (ref 12–44)
LYMPH%: 24.9 % (ref 12–44)
LYMPH%: 25.3 % (ref 12–44)
LYMPH%: 25.5 % (ref 12–44)
LYMPH%: 25.6 % (ref 12–44)
LYMPH%: 30.7 % (ref 12–44)
LYMPH%: 31 % (ref 12–44)
LYMPH%: 31.7 % (ref 12–44)
LYMPH%: 38.9 % (ref 12–44)
LYMPH%: 43.2 % (ref 12–44)
LYMPH%: 46.6 % (ref 12–44)
LYMPH%: 5.4 % (ref 12–44)
LYMPH%: 50.8 % (ref 12–44)
LYMPH%: 52.5 % (ref 12–44)
LYMPH%: 52.5 % (ref 12–44)
LYMPH%: 74.5 % (ref 12–44)
LYMPH%: 8.5 % (ref 12–44)
LYMPH%: 9.3 % (ref 12–44)
LYMPH: 0.4 10^3/UL (ref 0.8–2.8)
LYMPH: 0.4 10^3/UL (ref 0.8–2.8)
LYMPH: 0.5 10^3/UL (ref 0.8–2.8)
LYMPH: 0.6 10^3/UL (ref 0.8–2.8)
LYMPH: 0.7 10^3/UL (ref 0.8–2.8)
LYMPH: 0.8 10^3/UL (ref 0.8–2.8)
LYMPH: 0.9 10^3/UL (ref 0.8–2.8)
LYMPH: 1 10^3/UL (ref 0.8–2.8)
LYMPH: 1.1 10^3/UL (ref 0.8–2.8)
LYMPH: 1.2 10^3/UL (ref 0.8–2.8)
LYMPH: 1.3 10^3/UL (ref 0.8–2.8)
LYMPH: 1.4 10^3/UL (ref 0.8–2.8)
LYMPH: 1.4 10^3/UL (ref 0.8–2.8)
LYMPH: 1.5 10^3/UL (ref 0.8–2.8)
LYMPH: 1.7 10^3/UL (ref 0.8–2.8)
MCH: 27.2 PG (ref 26–33)
MCH: 27.5 PG (ref 26–33)
MCH: 28.2 PG (ref 26–33)
MCH: 28.5 PG (ref 26–33)
MCH: 29 PG (ref 26–33)
MCH: 29.2 PG (ref 26–33)
MCH: 29.2 PG (ref 26–33)
MCH: 29.3 PG (ref 26–33)
MCH: 29.3 PG (ref 26–33)
MCH: 29.4 PG (ref 26–33)
MCH: 29.5 PG (ref 26–33)
MCH: 29.5 PG (ref 26–33)
MCH: 29.6 PG (ref 26–33)
MCH: 29.7 PG (ref 26–33)
MCH: 29.9 PG (ref 26–33)
MCH: 29.9 PG (ref 26–33)
MCH: 30 PG (ref 26–33)
MCH: 30.2 PG (ref 26–33)
MCH: 30.3 PG (ref 26–33)
MCH: 30.3 PG (ref 26–33)
MCH: 30.5 PG (ref 26–33)
MCH: 30.5 PG (ref 26–33)
MCH: 30.8 PG (ref 26–33)
MCH: 31 PG (ref 26–33)
MCH: 31.1 PG (ref 26–33)
MCH: 31.1 PG (ref 26–33)
MCH: 31.3 PG (ref 26–33)
MCH: 31.4 PG (ref 26–33)
MCH: 31.6 PG (ref 26–33)
MCH: 31.7 PG (ref 26–33)
MCH: 31.8 PG (ref 26–33)
MCH: 31.9 PG (ref 26–33)
MCH: 31.9 PG (ref 26–33)
MCH: 32 PG (ref 26–33)
MCH: 32 PG (ref 26–33)
MCH: 32.4 PG (ref 26–33)
MCH: 32.7 PG (ref 26–33)
MCH: 33.8 PG (ref 26–33)
MCH: 34.4 PG (ref 26–33)
MCHC: 29.5 G/DL (ref 31–36)
MCHC: 29.6 G/DL (ref 31–36)
MCHC: 30.1 G/DL (ref 31–36)
MCHC: 30.2 G/DL (ref 31–36)
MCHC: 30.2 G/DL (ref 31–36)
MCHC: 30.3 G/DL (ref 31–36)
MCHC: 30.3 G/DL (ref 31–36)
MCHC: 30.4 G/DL (ref 31–36)
MCHC: 30.5 G/DL (ref 31–36)
MCHC: 30.5 G/DL (ref 31–36)
MCHC: 30.6 G/DL (ref 31–36)
MCHC: 30.6 G/DL (ref 31–36)
MCHC: 30.7 G/DL (ref 31–36)
MCHC: 30.8 G/DL (ref 31–36)
MCHC: 30.8 G/DL (ref 31–36)
MCHC: 31 G/DL (ref 31–36)
MCHC: 31.1 G/DL (ref 31–36)
MCHC: 31.1 G/DL (ref 31–36)
MCHC: 31.2 G/DL (ref 31–36)
MCHC: 31.2 G/DL (ref 31–36)
MCHC: 31.3 G/DL (ref 31–36)
MCHC: 31.3 G/DL (ref 31–36)
MCHC: 31.4 G/DL (ref 31–36)
MCHC: 31.5 G/DL (ref 31–36)
MCHC: 31.8 G/DL (ref 31–36)
MCHC: 31.9 G/DL (ref 31–36)
MCHC: 32 G/DL (ref 31–36)
MCHC: 32.3 G/DL (ref 31–36)
MCHC: 32.4 G/DL (ref 31–36)
MCHC: 32.6 G/DL (ref 31–36)
MCHC: 33.1 G/DL (ref 31–36)
MCHC: 33.1 G/DL (ref 31–36)
MCHC: 33.2 G/DL (ref 31–36)
MCHC: 33.2 G/DL (ref 31–36)
MCHC: 33.3 G/DL (ref 31–36)
MCHC: 33.6 G/DL (ref 31–36)
MCHC: 33.8 G/DL (ref 31–36)
MCV: 100 FML (ref 82–100)
MCV: 100.4 FML (ref 82–100)
MCV: 101 FML (ref 82–100)
MCV: 102.1 FML (ref 82–100)
MCV: 103.1 FML (ref 82–100)
MCV: 103.2 FML (ref 82–100)
MCV: 103.9 FML (ref 82–100)
MCV: 104 FML (ref 82–100)
MCV: 110.7 FML (ref 82–100)
MCV: 91.7 FML (ref 82–100)
MCV: 92.4 FML (ref 82–100)
MCV: 93.2 FML (ref 82–100)
MCV: 93.2 FML (ref 82–100)
MCV: 94.2 FML (ref 82–100)
MCV: 94.2 FML (ref 82–100)
MCV: 94.5 FML (ref 82–100)
MCV: 95 FML (ref 82–100)
MCV: 95.1 FML (ref 82–100)
MCV: 95.4 FML (ref 82–100)
MCV: 95.4 FML (ref 82–100)
MCV: 95.5 FML (ref 82–100)
MCV: 95.6 FML (ref 82–100)
MCV: 96 FML (ref 82–100)
MCV: 96 FML (ref 82–100)
MCV: 96.1 FML (ref 82–100)
MCV: 96.3 FML (ref 82–100)
MCV: 96.7 FML (ref 82–100)
MCV: 96.8 FML (ref 82–100)
MCV: 96.9 FML (ref 82–100)
MCV: 97.3 FML (ref 82–100)
MCV: 97.5 FML (ref 82–100)
MCV: 97.8 FML (ref 82–100)
MCV: 97.8 FML (ref 82–100)
MCV: 99 FML (ref 82–100)
MCV: 99.3 FML (ref 82–100)
MONO%: 10.5 % (ref 2–12)
MONO%: 10.7 % (ref 2–12)
MONO%: 10.8 % (ref 2–12)
MONO%: 10.8 % (ref 2–12)
MONO%: 11.6 % (ref 2–12)
MONO%: 11.6 % (ref 2–12)
MONO%: 11.9 % (ref 2–12)
MONO%: 12.5 % (ref 2–12)
MONO%: 12.7 % (ref 2–12)
MONO%: 13.1 % (ref 2–12)
MONO%: 13.1 % (ref 2–12)
MONO%: 13.3 % (ref 2–12)
MONO%: 13.9 % (ref 2–12)
MONO%: 14 % (ref 2–12)
MONO%: 14.1 % (ref 2–12)
MONO%: 14.1 % (ref 2–12)
MONO%: 14.2 % (ref 2–12)
MONO%: 14.8 % (ref 2–12)
MONO%: 15.5 % (ref 2–12)
MONO%: 16.6 % (ref 2–12)
MONO%: 16.9 % (ref 2–12)
MONO%: 17.3 % (ref 2–12)
MONO%: 17.9 % (ref 2–12)
MONO%: 21.5 % (ref 2–12)
MONO%: 23.3 % (ref 2–12)
MONO%: 25.1 % (ref 2–12)
MONO%: 3.1 % (ref 2–12)
MONO%: 35 % (ref 2–12)
MONO%: 4.9 % (ref 2–12)
MONO%: 5.3 % (ref 2–12)
MONO%: 5.6 % (ref 2–12)
MONO%: 5.7 % (ref 2–12)
MONO%: 6 % (ref 2–12)
MONO%: 6.3 % (ref 2–12)
MONO%: 7.1 % (ref 2–12)
MONO%: 7.2 % (ref 2–12)
MONO%: 7.6 % (ref 2–12)
MONO%: 8.1 % (ref 2–12)
MONO%: 8.2 % (ref 2–12)
MONO%: 8.5 % (ref 2–12)
MONO%: 9 % (ref 2–12)
MONO%: 9.1 % (ref 2–12)
MONO: 0.1 10^3/UL (ref 0.2–1)
MONO: 0.1 10^3/UL (ref 0.2–1)
MONO: 0.2 10^3/UL (ref 0.2–1)
MONO: 0.2 10^3/UL (ref 0.2–1)
MONO: 0.3 10^3/UL (ref 0.2–1)
MONO: 0.4 10^3/UL (ref 0.2–1)
MONO: 0.5 10^3/UL (ref 0.2–1)
MONO: 0.6 10^3/UL (ref 0.2–1)
MONO: 0.7 10^3/UL (ref 0.2–1)
MONO: 0.8 10^3/UL (ref 0.2–1)
MONO: 0.8 10^3/UL (ref 0.2–1)
MONO: 0.9 10^3/UL (ref 0.2–1)
MONO: 0.9 10^3/UL (ref 0.2–1)
MONO: 1 10^3/UL (ref 0.2–1)
MONO: 1.2 10^3/UL (ref 0.2–1)
MPV: 7.7 FML (ref 8.6–11.7)
MPV: 8.2 FML (ref 8.6–11.7)
MPV: 8.3 FML (ref 8.6–11.7)
MPV: 8.4 FML (ref 8.6–11.7)
MPV: 8.5 FML (ref 8.6–11.7)
MPV: 8.6 FML (ref 8.6–11.7)
MPV: 8.7 FML (ref 8.6–11.7)
MPV: 8.8 FML (ref 8.6–11.7)
MPV: 8.8 FML (ref 8.6–11.7)
MPV: 8.9 FML (ref 8.6–11.7)
MPV: 9 FML (ref 8.6–11.7)
MPV: 9 FML (ref 8.6–11.7)
MPV: 9.1 FML (ref 8.6–11.7)
MPV: 9.1 FML (ref 8.6–11.7)
MPV: 9.2 FML (ref 8.6–11.7)
MPV: 9.2 FML (ref 8.6–11.7)
MPV: 9.3 FML (ref 8.6–11.7)
MPV: 9.4 FML (ref 8.6–11.7)
NEUT%: 14.9 % (ref 47–76)
NEUT%: 17.7 % (ref 47–76)
NEUT%: 24.7 % (ref 47–76)
NEUT%: 30.6 % (ref 47–76)
NEUT%: 34.5 % (ref 47–76)
NEUT%: 35.9 % (ref 47–76)
NEUT%: 36.9 % (ref 47–76)
NEUT%: 37.7 % (ref 47–76)
NEUT%: 52.3 % (ref 47–76)
NEUT%: 53.4 % (ref 47–76)
NEUT%: 54.8 % (ref 47–76)
NEUT%: 55.4 % (ref 47–76)
NEUT%: 55.7 % (ref 47–76)
NEUT%: 57.9 % (ref 47–76)
NEUT%: 58.1 % (ref 47–76)
NEUT%: 59.1 % (ref 47–76)
NEUT%: 60.2 % (ref 47–76)
NEUT%: 61.5 % (ref 47–76)
NEUT%: 61.7 % (ref 47–76)
NEUT%: 62.3 % (ref 47–76)
NEUT%: 63.4 % (ref 47–76)
NEUT%: 63.6 % (ref 47–76)
NEUT%: 64.2 % (ref 47–76)
NEUT%: 64.7 % (ref 47–76)
NEUT%: 65.6 % (ref 47–76)
NEUT%: 65.8 % (ref 47–76)
NEUT%: 69.5 % (ref 47–76)
NEUT%: 70.1 % (ref 47–76)
NEUT%: 70.6 % (ref 47–76)
NEUT%: 71.5 % (ref 47–76)
NEUT%: 74.1 % (ref 47–76)
NEUT%: 74.7 % (ref 47–76)
NEUT%: 75.7 % (ref 47–76)
NEUT%: 79.4 % (ref 47–76)
NEUT%: 80.6 % (ref 47–76)
NEUT%: 81.4 % (ref 47–76)
NEUT%: 82.2 % (ref 47–76)
NEUT%: 82.6 % (ref 47–76)
NEUT%: 83.6 % (ref 47–76)
NEUT%: 84.5 % (ref 47–76)
NEUT%: 85.7 % (ref 47–76)
NEUT%: 91.2 % (ref 47–76)
NEUT: 0.2 10^3/UL (ref 1.5–7.1)
NEUT: 0.4 10^3/UL (ref 1.5–7.1)
NEUT: 0.6 10^3/UL (ref 1.5–7.1)
NEUT: 0.7 10^3/UL (ref 1.5–7.1)
NEUT: 0.7 10^3/UL (ref 1.5–7.1)
NEUT: 1.1 10^3/UL (ref 1.5–7.1)
NEUT: 1.2 10^3/UL (ref 1.5–7.1)
NEUT: 1.3 10^3/UL (ref 1.5–7.1)
NEUT: 2.2 10^3/UL (ref 1.5–7.1)
NEUT: 2.3 10^3/UL (ref 1.5–7.1)
NEUT: 2.3 10^3/UL (ref 1.5–7.1)
NEUT: 2.4 10^3/UL (ref 1.5–7.1)
NEUT: 2.5 10^3/UL (ref 1.5–7.1)
NEUT: 2.7 10^3/UL (ref 1.5–7.1)
NEUT: 2.8 10^3/UL (ref 1.5–7.1)
NEUT: 2.8 10^3/UL (ref 1.5–7.1)
NEUT: 2.9 10^3/UL (ref 1.5–7.1)
NEUT: 3 10^3/UL (ref 1.5–7.1)
NEUT: 3 10^3/UL (ref 1.5–7.1)
NEUT: 3.1 10^3/UL (ref 1.5–7.1)
NEUT: 3.2 10^3/UL (ref 1.5–7.1)
NEUT: 3.3 10^3/UL (ref 1.5–7.1)
NEUT: 3.3 10^3/UL (ref 1.5–7.1)
NEUT: 3.5 10^3/UL (ref 1.5–7.1)
NEUT: 3.7 10^3/UL (ref 1.5–7.1)
NEUT: 3.7 10^3/UL (ref 1.5–7.1)
NEUT: 5.6 10^3/UL (ref 1.5–7.1)
NEUT: 6 10^3/UL (ref 1.5–7.1)
NEUT: 6 10^3/UL (ref 1.5–7.1)
NEUT: 6.3 10^3/UL (ref 1.5–7.1)
NEUT: 7.1 10^3/UL (ref 1.5–7.1)
NEUT: 7.5 10^3/UL (ref 1.5–7.1)
NEUT: 8.8 10^3/UL (ref 1.5–7.1)
PLT: 120 10^3/UL (ref 150–375)
PLT: 123 10^3/UL (ref 150–375)
PLT: 137 10^3/UL (ref 150–375)
PLT: 141 10^3/UL (ref 150–375)
PLT: 145 10^3/UL (ref 150–375)
PLT: 159 10^3/UL (ref 150–375)
PLT: 174 10^3/UL (ref 150–375)
PLT: 181 10^3/UL (ref 150–375)
PLT: 184 10^3/UL (ref 150–375)
PLT: 190 10^3/UL (ref 150–375)
PLT: 200 10^3/UL (ref 150–375)
PLT: 203 10^3/UL (ref 150–375)
PLT: 204 10^3/UL (ref 150–375)
PLT: 206 10^3/UL (ref 150–375)
PLT: 207 10^3/UL (ref 150–375)
PLT: 222 10^3/UL (ref 150–375)
PLT: 224 10^3/UL (ref 150–375)
PLT: 227 10^3/UL (ref 150–375)
PLT: 228 10^3/UL (ref 150–375)
PLT: 231 10^3/UL (ref 150–375)
PLT: 237 10^3/UL (ref 150–375)
PLT: 240 10^3/UL (ref 150–375)
PLT: 248 10^3/UL (ref 150–375)
PLT: 251 10^3/UL (ref 150–375)
PLT: 255 10^3/UL (ref 150–375)
PLT: 260 10^3/UL (ref 150–375)
PLT: 269 10^3/UL (ref 150–375)
PLT: 270 10^3/UL (ref 150–375)
PLT: 271 10^3/UL (ref 150–375)
PLT: 272 10^3/UL (ref 150–375)
PLT: 284 10^3/UL (ref 150–375)
PLT: 285 10^3/UL (ref 150–375)
PLT: 287 10^3/UL (ref 150–375)
PLT: 289 10^3/UL (ref 150–375)
PLT: 295 10^3/UL (ref 150–375)
PLT: 302 10^3/UL (ref 150–375)
PLT: 313 10^3/UL (ref 150–375)
PLT: 313 10^3/UL (ref 150–375)
PLT: 320 10^3/UL (ref 150–375)
PLT: 323 10^3/UL (ref 150–375)
PLT: 348 10^3/UL (ref 150–375)
PLT: 365 10^3/UL (ref 150–375)
POTASSIUM: 3.7 MMOL/L (ref 3.5–5.3)
POTASSIUM: 3.8 MMOL/L (ref 3.5–5.3)
POTASSIUM: 3.9 MMOL/L (ref 3.5–5.3)
POTASSIUM: 4.1 MMOL/L (ref 3.5–5.3)
POTASSIUM: 4.2 MMOL/L
POTASSIUM: 4.2 MMOL/L (ref 3.5–5.3)
POTASSIUM: 4.3 MMOL/L (ref 3.5–5.3)
POTASSIUM: 4.4 MMOL/L (ref 3.5–5.3)
POTASSIUM: 4.6 MMOL/L (ref 3.5–5.3)
PROTEIN, TOTAL: 5 G/DL (ref 6.1–8.1)
PROTEIN, TOTAL: 5.3 G/DL (ref 6.1–8.1)
PROTEIN, TOTAL: 5.3 G/DL (ref 6.1–8.1)
PROTEIN, TOTAL: 6.2 G/DL (ref 6.1–8.1)
PROTEIN, TOTAL: 6.3 G/DL (ref 6.1–8.1)
PROTEIN, TOTAL: 6.3 G/DL (ref 6.1–8.1)
PROTEIN, TOTAL: 6.4 G/DL
PROTEIN, TOTAL: 6.4 G/DL (ref 6.1–8.1)
PROTEIN, TOTAL: 6.4 G/DL (ref 6.1–8.1)
PROTEIN, TOTAL: 6.5 G/DL (ref 6.1–8.1)
PROTEIN, TOTAL: 6.6 G/DL (ref 6.1–8.1)
PROTEIN, TOTAL: 6.6 G/DL (ref 6.1–8.1)
PROTEIN, TOTAL: 6.7 G/DL (ref 6.1–8.1)
PROTEIN, TOTAL: 6.9 G/DL (ref 6.1–8.1)
RBC: 2.38 10^6/UL (ref 4.2–6.2)
RBC: 2.56 10^6/UL (ref 4.2–6.2)
RBC: 2.57 10^6/UL (ref 4.2–6.2)
RBC: 2.59 10^6/UL (ref 4.2–6.2)
RBC: 2.63 10^6/UL (ref 4.2–6.2)
RBC: 2.67 10^6/UL (ref 4.2–6.2)
RBC: 2.68 10^6/UL (ref 4.2–6.2)
RBC: 2.73 10^6/UL (ref 4.2–6.2)
RBC: 2.74 10^6/UL (ref 4.2–6.2)
RBC: 2.75 10^6/UL (ref 4.2–6.2)
RBC: 2.78 10^6/UL (ref 4.2–6.2)
RBC: 2.82 10^6/UL (ref 4.2–6.2)
RBC: 2.83 10^6/UL (ref 4.2–6.2)
RBC: 2.84 10^6/UL (ref 4.2–6.2)
RBC: 2.86 10^6/UL (ref 4.2–6.2)
RBC: 2.9 10^6/UL (ref 4.2–6.2)
RBC: 2.93 10^6/UL (ref 4.2–6.2)
RBC: 2.98 10^6/UL (ref 4.2–6.2)
RBC: 2.99 10^6/UL (ref 4.2–6.2)
RBC: 3 10^6/UL (ref 4.2–6.2)
RBC: 3 10^6/UL (ref 4.2–6.2)
RBC: 3.02 10^6/UL (ref 4.2–6.2)
RBC: 3.02 10^6/UL (ref 4.2–6.2)
RBC: 3.04 10^6/UL (ref 4.2–6.2)
RBC: 3.05 10^6/UL (ref 4.2–6.2)
RBC: 3.08 10^6/UL (ref 4.2–6.2)
RBC: 3.09 10^6/UL (ref 4.2–6.2)
RBC: 3.15 10^6/UL (ref 4.2–6.2)
RBC: 3.15 10^6/UL (ref 4.2–6.2)
RBC: 3.16 10^6/UL (ref 4.2–6.2)
RBC: 3.18 10^6/UL (ref 4.2–6.2)
RBC: 3.2 10^6/UL (ref 4.2–6.2)
RBC: 3.2 10^6/UL (ref 4.2–6.2)
RBC: 3.21 10^6/UL (ref 4.2–6.2)
RBC: 3.23 10^6/UL (ref 4.2–6.2)
RBC: 3.29 10^6/UL (ref 4.2–6.2)
RBC: 3.3 10^6/UL (ref 4.2–6.2)
RBC: 3.32 10^6/UL (ref 4.2–6.2)
RDW-CV: 12 %
RDW-CV: 12.1 %
RDW-CV: 12.1 %
RDW-CV: 12.5 %
RDW-CV: 12.7 %
RDW-CV: 12.9 %
RDW-CV: 13 %
RDW-CV: 13.6 %
RDW-CV: 14.2 %
RDW-CV: 14.2 %
RDW-CV: 14.4 %
RDW-CV: 14.5 %
RDW-CV: 14.6 %
RDW-CV: 14.8 %
RDW-CV: 15.2 %
RDW-CV: 15.2 %
RDW-CV: 15.9 %
RDW-CV: 16.1 %
RDW-CV: 16.3 %
RDW-CV: 16.4 %
RDW-CV: 16.7 %
RDW-CV: 16.9 %
RDW-CV: 17 %
RDW-CV: 17.1 %
RDW-CV: 17.4 %
RDW-CV: 17.5 %
RDW-CV: 17.6 %
RDW-CV: 17.7 %
RDW-CV: 18 %
RDW-CV: 18.7 %
RDW-CV: 18.9 %
RDW-CV: 19 %
RDW-CV: 19.4 %
RDW-CV: 19.9 %
RDW-CV: 20.3 %
RDW-CV: 20.4 %
RDW-CV: 20.6 %
RDW-CV: 20.7 %
RDW-CV: 20.8 %
RDW-CV: 21.1 %
RDW-SD: 40.9 FML (ref 36–50)
RDW-SD: 41 FML (ref 36–50)
RDW-SD: 41.6 FML (ref 36–50)
RDW-SD: 42.1 FML (ref 36–50)
RDW-SD: 42.8 FML (ref 36–50)
RDW-SD: 43.3 FML (ref 36–50)
RDW-SD: 44.6 FML (ref 36–50)
RDW-SD: 44.9 FML (ref 36–50)
RDW-SD: 46.1 FML (ref 36–50)
RDW-SD: 47.8 FML (ref 36–50)
RDW-SD: 48.5 FML (ref 36–50)
RDW-SD: 48.7 FML (ref 36–50)
RDW-SD: 48.9 FML (ref 36–50)
RDW-SD: 49 FML (ref 36–50)
RDW-SD: 49.7 FML (ref 36–50)
RDW-SD: 50.3 FML (ref 36–50)
RDW-SD: 52.5 FML (ref 36–50)
RDW-SD: 53.1 FML (ref 36–50)
RDW-SD: 53.4 FML (ref 36–50)
RDW-SD: 53.4 FML (ref 36–50)
RDW-SD: 54 FML (ref 36–50)
RDW-SD: 55.9 FML (ref 36–50)
RDW-SD: 58 FML (ref 36–50)
RDW-SD: 59.5 FML (ref 36–50)
RDW-SD: 60.3 FML (ref 36–50)
RDW-SD: 60.8 FML (ref 36–50)
RDW-SD: 61.2 FML (ref 36–50)
RDW-SD: 61.3 FML (ref 36–50)
RDW-SD: 61.8 FML (ref 36–50)
RDW-SD: 62.1 FML (ref 36–50)
RDW-SD: 62.9 FML (ref 36–50)
RDW-SD: 63.6 FML (ref 36–50)
RDW-SD: 65.2 FML (ref 36–50)
RDW-SD: 67.8 FML (ref 36–50)
RDW-SD: 68.9 FML (ref 36–50)
RDW-SD: 68.9 FML (ref 36–50)
RDW-SD: 69.2 FML (ref 36–50)
RDW-SD: 69.3 FML (ref 36–50)
RDW-SD: 69.6 FML (ref 36–50)
RDW-SD: 69.8 FML (ref 36–50)
RDW-SD: 71.9 FML (ref 36–50)
RDW-SD: 72.4 FML (ref 36–50)
SODIUM: 134 MMOL/L (ref 135–146)
SODIUM: 136 MMOL/L (ref 135–146)
SODIUM: 136 MMOL/L (ref 135–146)
SODIUM: 137 MMOL/L (ref 135–146)
SODIUM: 137 MMOL/L (ref 135–146)
SODIUM: 138 MMOL/L (ref 135–146)
SODIUM: 139 MMOL/L
SODIUM: 139 MMOL/L (ref 135–146)
SODIUM: 140 MMOL/L (ref 135–146)
SODIUM: 141 MMOL/L (ref 135–146)
SODIUM: 143 MMOL/L (ref 135–146)
SODIUM: 143 MMOL/L (ref 135–146)
SPECIMEN INTEGRITY COMPROMISED: NORMAL
T-3 UPTAKE: 34 % (ref 22–35)
T-4, FREE: 1 NG/DL (ref 0.8–1.8)
TSH, 3RD GENERATION: 11.18 MIU/L (ref 0.4–4.5)
TSH, 3RD GENERATION: 12.29 MIU/L (ref 0.4–4.5)
TSH, 3RD GENERATION: 19.71 MIU/L (ref 0.4–4.5)
UREA NITROGEN (BUN): 12 MG/DL (ref 7–25)
UREA NITROGEN (BUN): 14 MG/DL (ref 7–25)
UREA NITROGEN (BUN): 15 MG/DL (ref 7–25)
UREA NITROGEN (BUN): 16 MG/DL
UREA NITROGEN (BUN): 16 MG/DL (ref 7–25)
UREA NITROGEN (BUN): 17 MG/DL (ref 7–25)
UREA NITROGEN (BUN): 19 MG/DL (ref 7–25)
UREA NITROGEN (BUN): 19 MG/DL (ref 7–25)
UREA NITROGEN (BUN): 20 MG/DL (ref 7–25)
UREA NITROGEN (BUN): 21 MG/DL (ref 7–25)
UREA NITROGEN (BUN): 24 MG/DL (ref 7–25)
WBC: 1 10^3/UL (ref 4.3–11)
WBC: 2 10^3/UL (ref 4.3–11)
WBC: 2.3 10^3/UL (ref 4.3–11)
WBC: 2.4 10^3/UL (ref 4.3–11)
WBC: 2.4 10^3/UL (ref 4.3–11)
WBC: 3 10^3/UL (ref 4.3–11)
WBC: 3.3 10^3/UL (ref 4.3–11)
WBC: 3.3 10^3/UL (ref 4.3–11)
WBC: 3.4 10^3/UL (ref 4.3–11)
WBC: 3.4 10^3/UL (ref 4.3–11)
WBC: 3.5 10^3/UL (ref 4.3–11)
WBC: 3.6 10^3/UL (ref 4.3–11)
WBC: 4 10^3/UL (ref 4.3–11)
WBC: 4.1 10^3/UL (ref 4.3–11)
WBC: 4.1 10^3/UL (ref 4.3–11)
WBC: 4.2 10^3/UL (ref 4.3–11)
WBC: 4.3 10^3/UL (ref 4.3–11)
WBC: 4.6 10^3/UL (ref 4.3–11)
WBC: 4.9 10^3/UL (ref 4.3–11)
WBC: 4.9 10^3/UL (ref 4.3–11)
WBC: 5 10^3/UL (ref 4.3–11)
WBC: 5 10^3/UL (ref 4.3–11)
WBC: 5.3 10^3/UL (ref 4.3–11)
WBC: 5.4 10^3/UL (ref 4.3–11)
WBC: 5.5 10^3/UL (ref 4.3–11)
WBC: 5.7 10^3/UL (ref 4.3–11)
WBC: 6 10^3/UL (ref 4.3–11)
WBC: 6.9 10^3/UL (ref 4.3–11)
WBC: 7.2 10^3/UL (ref 4.3–11)
WBC: 7.3 10^3/UL (ref 4.3–11)
WBC: 8.6 10^3/UL (ref 4.3–11)
WBC: 8.9 10^3/UL (ref 4.3–11)
WBC: 9 10^3/UL (ref 4.3–11)
WBC: 9.6 10^3/UL (ref 4.3–11)

## 2020-10-11 VITALS — DIASTOLIC BLOOD PRESSURE: 84 MMHG | WEIGHT: 158 LBS | SYSTOLIC BLOOD PRESSURE: 136 MMHG

## 2020-10-11 VITALS — WEIGHT: 159.19 LBS | SYSTOLIC BLOOD PRESSURE: 126 MMHG | DIASTOLIC BLOOD PRESSURE: 70 MMHG

## 2020-10-11 VITALS
DIASTOLIC BLOOD PRESSURE: 82 MMHG | HEIGHT: 65 IN | BODY MASS INDEX: 22.66 KG/M2 | WEIGHT: 136 LBS | SYSTOLIC BLOOD PRESSURE: 136 MMHG

## 2020-10-11 VITALS — WEIGHT: 134.11 LBS

## 2020-10-11 VITALS — WEIGHT: 159.39 LBS | WEIGHT: 159 LBS | DIASTOLIC BLOOD PRESSURE: 70 MMHG | SYSTOLIC BLOOD PRESSURE: 122 MMHG

## 2020-10-11 VITALS — DIASTOLIC BLOOD PRESSURE: 72 MMHG | SYSTOLIC BLOOD PRESSURE: 130 MMHG | WEIGHT: 161.8 LBS

## 2020-10-11 VITALS — DIASTOLIC BLOOD PRESSURE: 72 MMHG | SYSTOLIC BLOOD PRESSURE: 130 MMHG | WEIGHT: 166.6 LBS

## 2020-10-11 VITALS — DIASTOLIC BLOOD PRESSURE: 78 MMHG | WEIGHT: 161.8 LBS | SYSTOLIC BLOOD PRESSURE: 128 MMHG

## 2020-10-11 VITALS — SYSTOLIC BLOOD PRESSURE: 142 MMHG | WEIGHT: 162.5 LBS | DIASTOLIC BLOOD PRESSURE: 82 MMHG

## 2020-10-11 VITALS — DIASTOLIC BLOOD PRESSURE: 74 MMHG | WEIGHT: 155.8 LBS | SYSTOLIC BLOOD PRESSURE: 124 MMHG

## 2020-10-11 VITALS — WEIGHT: 152.6 LBS | SYSTOLIC BLOOD PRESSURE: 136 MMHG | DIASTOLIC BLOOD PRESSURE: 76 MMHG

## 2020-10-11 VITALS — SYSTOLIC BLOOD PRESSURE: 142 MMHG | DIASTOLIC BLOOD PRESSURE: 78 MMHG | WEIGHT: 159.39 LBS

## 2020-10-11 VITALS
WEIGHT: 146.01 LBS | BODY MASS INDEX: 24.33 KG/M2 | HEIGHT: 65 IN | SYSTOLIC BLOOD PRESSURE: 134 MMHG | DIASTOLIC BLOOD PRESSURE: 80 MMHG

## 2020-10-11 VITALS — WEIGHT: 156.2 LBS | SYSTOLIC BLOOD PRESSURE: 142 MMHG | DIASTOLIC BLOOD PRESSURE: 80 MMHG

## 2020-10-11 VITALS — WEIGHT: 142 LBS | SYSTOLIC BLOOD PRESSURE: 132 MMHG | DIASTOLIC BLOOD PRESSURE: 80 MMHG | WEIGHT: 137.19 LBS

## 2020-10-11 VITALS — WEIGHT: 167.2 LBS | DIASTOLIC BLOOD PRESSURE: 74 MMHG | SYSTOLIC BLOOD PRESSURE: 132 MMHG | WEIGHT: 164.99 LBS

## 2020-10-11 VITALS — WEIGHT: 159 LBS | WEIGHT: 159 LBS

## 2020-10-11 VITALS — WEIGHT: 152.01 LBS | SYSTOLIC BLOOD PRESSURE: 120 MMHG | DIASTOLIC BLOOD PRESSURE: 70 MMHG | WEIGHT: 151.5 LBS

## 2020-10-11 VITALS — WEIGHT: 161 LBS | WEIGHT: 163.01 LBS

## 2020-10-11 VITALS — WEIGHT: 136.99 LBS | SYSTOLIC BLOOD PRESSURE: 140 MMHG | DIASTOLIC BLOOD PRESSURE: 84 MMHG | WEIGHT: 137.81 LBS

## 2020-10-11 VITALS — SYSTOLIC BLOOD PRESSURE: 126 MMHG | DIASTOLIC BLOOD PRESSURE: 76 MMHG | WEIGHT: 154.5 LBS

## 2020-10-11 VITALS — WEIGHT: 135.01 LBS

## 2020-10-11 VITALS — WEIGHT: 164 LBS | WEIGHT: 159.39 LBS

## 2020-10-11 VITALS — SYSTOLIC BLOOD PRESSURE: 126 MMHG | WEIGHT: 152.01 LBS | DIASTOLIC BLOOD PRESSURE: 70 MMHG

## 2020-10-11 VITALS
DIASTOLIC BLOOD PRESSURE: 74 MMHG | WEIGHT: 160.21 LBS | BODY MASS INDEX: 26.69 KG/M2 | HEIGHT: 65 IN | SYSTOLIC BLOOD PRESSURE: 136 MMHG

## 2020-10-11 VITALS — WEIGHT: 158.8 LBS | SYSTOLIC BLOOD PRESSURE: 132 MMHG | DIASTOLIC BLOOD PRESSURE: 74 MMHG

## 2020-10-11 VITALS — WEIGHT: 166.69 LBS

## 2020-10-11 VITALS — WEIGHT: 160.1 LBS | SYSTOLIC BLOOD PRESSURE: 140 MMHG | DIASTOLIC BLOOD PRESSURE: 80 MMHG

## 2020-10-11 VITALS — SYSTOLIC BLOOD PRESSURE: 144 MMHG | WEIGHT: 151.39 LBS | DIASTOLIC BLOOD PRESSURE: 86 MMHG

## 2020-10-11 VITALS — WEIGHT: 135.6 LBS

## 2020-10-11 VITALS — WEIGHT: 161.09 LBS | SYSTOLIC BLOOD PRESSURE: 128 MMHG | DIASTOLIC BLOOD PRESSURE: 76 MMHG

## 2020-10-11 VITALS — WEIGHT: 166.01 LBS

## 2020-10-11 VITALS — WEIGHT: 135.8 LBS | SYSTOLIC BLOOD PRESSURE: 122 MMHG | DIASTOLIC BLOOD PRESSURE: 70 MMHG

## 2020-10-11 VITALS — WEIGHT: 152.01 LBS

## 2020-10-11 VITALS — WEIGHT: 160.01 LBS

## 2020-10-11 VITALS — WEIGHT: 132.89 LBS

## 2020-10-11 VITALS — WEIGHT: 149.3 LBS

## 2023-08-01 NOTE — PROGRESS NOTES
Notified Sasha Dennis APMIQUEL of confusion. Seeing bugs in room. Windows open, etc. Stopped PCA. Will restart with new medication per Sasha Dennis. Awaiting report. CHIP'd shanti and left IJ TLC. Negative Screen

## (undated) DEVICE — PROCEDURE KIT FOR HT-2000

## (undated) DEVICE — KENDALL SCD EXPRESS SLEEVES, KNEE LENGTH, MEDIUM: Brand: KENDALL SCD

## (undated) DEVICE — SUTURE SILK 2-0 SH

## (undated) DEVICE — SUTURE SILK 0

## (undated) DEVICE — SUTURE PROLENE 4-0 SH

## (undated) DEVICE — INTENDED TO BE USED TO OCCLUDE, RETRACT AND IDENTIFY ARTERIES, VEINS, TENDONS AND NERVES IN SURGICAL PROCEDURES: Brand: STERION®  VESSEL LOOP

## (undated) DEVICE — ENDOPATH ECHELON ENDOSCOPIC LINEAR CUTTER RELOADS, GREEN, 60MM: Brand: ECHELON ENDOPATH

## (undated) DEVICE — SUTURE VICRYL 3-0 SH

## (undated) DEVICE — PAD SACRAL SPAN AID

## (undated) DEVICE — HIPEC PACK: Brand: MEDLINE INDUSTRIES, INC.

## (undated) DEVICE — SOL  .9 1000ML BTL

## (undated) DEVICE — ABSORBABLE HEMOSTAT (OXIDIZED REGENERATED CELLULOSE, U.S.P.): Brand: SURGICEL

## (undated) DEVICE — SUTURE SILK 3-0 SH

## (undated) DEVICE — SUTURE PROLENE 4-0 RB-1

## (undated) DEVICE — BLANKET HYPOTHERMIA ADULT

## (undated) DEVICE — SUTURE PROLENE 2-0 MH

## (undated) DEVICE — SINGLE-USE CUT/COAGULATION HANDPIECE FOR OPEN SURGERY FOR THE PLASMAJET SYSTEM: Brand: PLASMAJET NEUTRAL PLASMA SURGERY SYSTEM

## (undated) DEVICE — VIOLET BRAIDED (POLYGLACTIN 910), SYNTHETIC ABSORBABLE SUTURE: Brand: COATED VICRYL

## (undated) DEVICE — GLOVE ORTHO ALOETOUCH SZ 71/2

## (undated) DEVICE — MEDI-VAC NON-CONDUCTIVE SUCTION TUBING: Brand: CARDINAL HEALTH

## (undated) DEVICE — DRAIN RELIAVAC 100CC

## (undated) DEVICE — ARGON HANDSET: Brand: VALLEYLAB

## (undated) DEVICE — TOWEL: OR BLU 80/CS: Brand: MEDICAL ACTION INDUSTRIES

## (undated) DEVICE — STRYKER HARMONIC 23CM REPRCSED

## (undated) DEVICE — GOWN,SIRUS,FABRIC-REINFORCED,X-LARGE: Brand: MEDLINE

## (undated) DEVICE — BASIC DOUBLE BASIN 1-LF: Brand: MEDLINE INDUSTRIES, INC.

## (undated) DEVICE — REM POLYHESIVE ADULT PATIENT RETURN ELECTRODE: Brand: VALLEYLAB

## (undated) DEVICE — NEEDLE ELECTRODE: Brand: EDGE

## (undated) DEVICE — ENDOPATH ECHELON ENDOSCOPIC LINEAR CUTTER RELOADS, WHITE, 60MM: Brand: ECHELON ENDOPATH

## (undated) DEVICE — COVER,MAYO STAND,STERILE: Brand: MEDLINE

## (undated) DEVICE — 40580 - THE PINK PAD - ADVANCED TRENDELENBURG POSITIONING KIT: Brand: 40580 - THE PINK PAD - ADVANCED TRENDELENBURG POSITIONING KIT

## (undated) DEVICE — SUTURE PROLENE 1 CT-1

## (undated) DEVICE — DRAIN BLAKE ROUND 15FR

## (undated) DEVICE — CHEMOTHERAPY CONTAINER,SLIDE LID, YELLOW: Brand: SHARPSAFETY

## (undated) DEVICE — SUTURE PROLENE 3-0 SH

## (undated) DEVICE — ECHELON FLEX POWERED PLUS COMPACT ARTICULATING ENDOSCOPIC LINEAR CUTTER, 60MM: Brand: ECHELON FLEX

## (undated) DEVICE — SUTURE PDS II 1 TP-1

## (undated) DEVICE — SUTURE SILK 0 FSL

## (undated) DEVICE — POOLE SUCTION HANDLE: Brand: CARDINAL HEALTH

## (undated) DEVICE — SUTURE ETHILON 4-0 PS-2

## (undated) NOTE — ED AVS SNAPSHOT
Sheri Sands   MRN: [id number]    Department:  [facility] Emergency Department   Date of Visit:  11/18/2018           Disclosure     Insurance plans vary and the physician(s) referred by the ER may not be covered by your plan.  Please contact yo tell this physician (or your personal doctor if your instructions are to return to your personal doctor) about any new or lasting problems. The primary care or specialist physician will see patients referred from the [facility] Emergency Department.  Jonelle Sen

## (undated) NOTE — MR AVS SNAPSHOT
[facility] [place]  [address] [place] [address], [address] [place] [address]                    After Visit Summary   2/10/2017    Carlos Avalos    MRN: [id number]           Visit Information        Provider Department Dept Phone    2/10/

## (undated) NOTE — MR AVS SNAPSHOT
[facility] [address], [address]                    After Visit Summary   1/11/2017    Florence Jersey    MRN: [id number]           Visit Information        Provider Department Dept Phone    1/11/ not sign up before the expiration date, you must request a new code. Your unique [employer] Access Code: [email or web address]  Expires: 2/26/2017 11:28 AM    If you have questions, you can call [phone] to talk to our [facility] Staff.  Remember, [employer]

## (undated) NOTE — IP AVS SNAPSHOT
[facility] [place] [address] [place], [address] ~ [phone]                Discharge Summary   1/31/2017    Savanna Petersen           Admission Information        Provider Department    1/31/2017 Brian Park MD  [id number]-ZURI         T Commonly known as:  [facility]   Next dose due: Tonight   Notes to Patient:  Continue per home schedule        Take 3.125 mg by mouth 2 (two) times daily with meals.                          folic acid 1 MG Tabs   Commonly known as:  Lin Burkitt   Notes to Patient: Specialties:  Internal Medicine, IP Consult to Primary Care    Contact information:    Tanner Farias   [phone]          Follow up with ALEXANDRA Gongora On 2/9/2017.     Specialty:  Surgical Oncology    Why:  Post o Blood detail:      Other PAT tests:  2 units PRBCs    Patient Preferred Phone Number:  [phone]    Is there another family member we can call?:       needed:  No    History of Sleep Apnea?:  No    Nursing Home / Special Facility:  No    Garrick 2.7 (02/06/17)  0.5 -- (02/06/17)  3.92 (02/06/17)  1.07 (02/06/17)  0.45 (02/06/17)  0.15 (02/06/17)  0.03    (02/05/17)  73.7 (02/05/17)  17.6 (02/05/17)  7.1 (02/05/17)  0.8 (02/05/17)  0.5  (02/05/17)  4.77 (02/05/17)  1.14 (02/05/17)  0.46 (02/05/17) [employer]     Sign up for [employer], your secure online medical record. [employer] will allow you to access patient instructions from your recent visit,  view other health information, and more. To sign up or find more information, go to [email or web address] [facility] Use: Lower cholesterol, protect your heart   Most common side effects: Dizziness, constipation, abnormal liver function   What to report to your healthcare team:  Dizziness, muscle aches, constipation           Blood Producing Medications     folic acid 1

## (undated) NOTE — IP AVS SNAPSHOT
[facility] [place] [address] [place], [address] ~ [phone]                Discharge Summary   2/10/2017    Tahir Suggs           Admission Information        Provider Department    2/10/2017 Tomas Cueva MD  7ne-A Commonly known as:  SYNTHROID, LEVOTHROID   Next dose due:  02/13/17 9am        Take 125 mcg by mouth before breakfast.                            lisinopril 2.5 MG Tabs   Last time this was given:  2.5 mg on 2/12/2017  8:11 AM   Commonly known as:  Rc Argu[place] Future Labs/Procedures Expected by Expires    XR CHEST PA + LAT CHEST (CPT=71020)  2/15/2017 (Approximate) 2/12/2018    Scheduling Instructions:     To schedule an appointment for your radiology test please call [facility] [address] Scheduling at [phone] HgbA1C Glucose BUN Creatinine Calcium Alkaline Phosph AST    -- (02/12/17)  82 (02/12/17)  8 (02/12/17)  0.79 (02/12/17)  7.8 (L) (02/06/17)  58 (02/06/17)  18      Metabolic Lab Results  (Last result in the past 90 days)    ALT Bilirubin,Total Total Prot experience these side effects or respond to medications the same. Please call your provider or healthcare team if you have any questions regarding your medications while at home.          Blood Pressure and Cardiac Medications     lisinopril 2.5 MG Oral Tab Potassium Chloride ER 20 MEQ Oral Tab CR

## (undated) NOTE — Clinical Note
[address], [address] [place], [place] [address]  [address]: [phone]  FAX: 9638 [facility] HospitalMD Fang [address] [place] [address] Street: [phone]

## (undated) NOTE — MR AVS SNAPSHOT
[facility] [address], [address] [place] [address]                    After Visit Summary   2/15/2017    Vlad Terry    MRN: [id number]           Visit Information        Provider Department Dept Phone    2/15/